# Patient Record
Sex: MALE | Race: BLACK OR AFRICAN AMERICAN | Employment: FULL TIME | URBAN - METROPOLITAN AREA
[De-identification: names, ages, dates, MRNs, and addresses within clinical notes are randomized per-mention and may not be internally consistent; named-entity substitution may affect disease eponyms.]

---

## 2018-11-12 ENCOUNTER — OFFICE VISIT (OUTPATIENT)
Dept: INTERNAL MEDICINE CLINIC | Facility: CLINIC | Age: 63
End: 2018-11-12
Payer: COMMERCIAL

## 2018-11-12 VITALS
HEART RATE: 94 BPM | SYSTOLIC BLOOD PRESSURE: 166 MMHG | WEIGHT: 272.88 LBS | DIASTOLIC BLOOD PRESSURE: 92 MMHG | TEMPERATURE: 98 F | BODY MASS INDEX: 35.02 KG/M2 | HEIGHT: 74 IN

## 2018-11-12 DIAGNOSIS — R25.2 MUSCLE CRAMPS: ICD-10-CM

## 2018-11-12 DIAGNOSIS — Z12.11 SCREENING FOR COLON CANCER: ICD-10-CM

## 2018-11-12 DIAGNOSIS — R39.9 LOWER URINARY TRACT SYMPTOMS (LUTS): ICD-10-CM

## 2018-11-12 DIAGNOSIS — Z00.00 ANNUAL PHYSICAL EXAM: Primary | ICD-10-CM

## 2018-11-12 DIAGNOSIS — Z12.5 SCREENING FOR PROSTATE CANCER: ICD-10-CM

## 2018-11-12 PROBLEM — I10 ESSENTIAL HYPERTENSION: Status: ACTIVE | Noted: 2018-11-12

## 2018-11-12 PROCEDURE — 99212 OFFICE O/P EST SF 10 MIN: CPT | Performed by: INTERNAL MEDICINE

## 2018-11-12 PROCEDURE — 99213 OFFICE O/P EST LOW 20 MIN: CPT | Performed by: INTERNAL MEDICINE

## 2018-11-12 PROCEDURE — 99386 PREV VISIT NEW AGE 40-64: CPT | Performed by: INTERNAL MEDICINE

## 2018-11-12 RX ORDER — AMLODIPINE BESYLATE 5 MG/1
5 TABLET ORAL DAILY
Qty: 30 TABLET | Refills: 3 | Status: SHIPPED | OUTPATIENT
Start: 2018-11-12 | End: 2018-11-26

## 2018-11-12 NOTE — PROGRESS NOTES
Kimmy Whelan is a 66year old male. Patient presents with:  Prostate Problem  Leg Pain      HPI:     HPI   Is a 42-year-old male here as a new patient to establish care and for physical  No recent doctors visit. No significant past medical history.   H History:  Social History    Tobacco Use      Smoking status: Never Smoker      Smokeless tobacco: Never Used    Alcohol use: No      Frequency: Never    Drug use: Not on file       REVIEW OF SYSTEMS:   Review of Systems   Constitutional: Negative for chill Abdominal: Soft. Bowel sounds are normal.   Genitourinary: No penile tenderness. Genitourinary Comments: P{t refused prostate exam   Musculoskeletal: Normal range of motion. Neurological: He is alert and oriented to person, place, and time.  He has no GASTRO - INTERNAL          Talked about age-appropriate vaccination. Tdap vaccination-for tetanus and whooping cough as recommended every 10 years. After age 48 shingles vaccination-Shingrix is recommended. It is a 2 series vaccination.   It has better p

## 2018-11-12 NOTE — ASSESSMENT & PLAN NOTE
Labs ordered. Discussed with the patient about age appropriate screening and immunization.   Advised healthy lifestyle with regular exercise and modification of the diet  Advised 30 minutes of aerobic exercise or weightbearing exercise daily for at least 5

## 2018-11-12 NOTE — ASSESSMENT & PLAN NOTE
Likely multifactorial due to lack of sleep, long-standing working hours and possible underlying THEODORE. Check electrolytes and kidney function. Replace as needed.   Suggested taking OTC B complex as it has a research evidence showing benefits in treating mus

## 2018-11-12 NOTE — ASSESSMENT & PLAN NOTE
Symptoms-nocturia and increased frequency. Unknown prostate hormone. Check PSA. Patient refused  exam.  Refer to urology.

## 2018-11-12 NOTE — ASSESSMENT & PLAN NOTE
Last colonoscopy more than 10 years ago. GI referral given. Patient has uncontrolled hypertension. GI consult prior to the colonoscopy.

## 2018-11-12 NOTE — PATIENT INSTRUCTIONS
I have started the medication-amlodipine 5 mg. The goal blood pressure reading is less than 130/80. Please watch  low salt diet. Eat less of canned , frozen, dried, packaged and fast food. Limit caffeine, alcohol. No smoking.   Exercise regularly, at

## 2018-11-12 NOTE — ASSESSMENT & PLAN NOTE
BP uncontrolled. Goal less than 130/80. Repeat blood pressure was 160/98. Has been hypertensive for 3 years. Patient refused treatment when suggested by his previous PCP 3 years ago. Willing to start BP meds now. Known kidney baseline.   Patient has m

## 2018-11-19 ENCOUNTER — LAB ENCOUNTER (OUTPATIENT)
Dept: LAB | Facility: HOSPITAL | Age: 63
End: 2018-11-19
Attending: INTERNAL MEDICINE
Payer: COMMERCIAL

## 2018-11-19 DIAGNOSIS — Z00.00 ANNUAL PHYSICAL EXAM: ICD-10-CM

## 2018-11-19 DIAGNOSIS — Z12.5 SCREENING FOR PROSTATE CANCER: ICD-10-CM

## 2018-11-19 DIAGNOSIS — R25.2 MUSCLE CRAMPS: ICD-10-CM

## 2018-11-19 DIAGNOSIS — R73.01 FASTING HYPERGLYCEMIA: ICD-10-CM

## 2018-11-19 PROCEDURE — 85025 COMPLETE CBC W/AUTO DIFF WBC: CPT

## 2018-11-19 PROCEDURE — 81001 URINALYSIS AUTO W/SCOPE: CPT

## 2018-11-19 PROCEDURE — 36415 COLL VENOUS BLD VENIPUNCTURE: CPT

## 2018-11-19 PROCEDURE — 80061 LIPID PANEL: CPT

## 2018-11-19 PROCEDURE — 84443 ASSAY THYROID STIM HORMONE: CPT

## 2018-11-19 PROCEDURE — 83036 HEMOGLOBIN GLYCOSYLATED A1C: CPT

## 2018-11-19 PROCEDURE — 83735 ASSAY OF MAGNESIUM: CPT

## 2018-11-19 PROCEDURE — 80053 COMPREHEN METABOLIC PANEL: CPT

## 2018-11-26 ENCOUNTER — OFFICE VISIT (OUTPATIENT)
Dept: INTERNAL MEDICINE CLINIC | Facility: CLINIC | Age: 63
End: 2018-11-26
Payer: COMMERCIAL

## 2018-11-26 VITALS
SYSTOLIC BLOOD PRESSURE: 122 MMHG | BODY MASS INDEX: 34.69 KG/M2 | WEIGHT: 270.31 LBS | HEIGHT: 74 IN | DIASTOLIC BLOOD PRESSURE: 78 MMHG

## 2018-11-26 DIAGNOSIS — I10 ESSENTIAL HYPERTENSION: ICD-10-CM

## 2018-11-26 DIAGNOSIS — R25.2 MUSCLE CRAMPS: ICD-10-CM

## 2018-11-26 DIAGNOSIS — E83.42 HYPOMAGNESEMIA: ICD-10-CM

## 2018-11-26 DIAGNOSIS — E78.1 HYPERTRIGLYCERIDEMIA: ICD-10-CM

## 2018-11-26 DIAGNOSIS — E11.65 TYPE 2 DIABETES MELLITUS WITH HYPERGLYCEMIA, WITHOUT LONG-TERM CURRENT USE OF INSULIN (HCC): Primary | ICD-10-CM

## 2018-11-26 PROCEDURE — 99212 OFFICE O/P EST SF 10 MIN: CPT | Performed by: INTERNAL MEDICINE

## 2018-11-26 PROCEDURE — 99215 OFFICE O/P EST HI 40 MIN: CPT | Performed by: INTERNAL MEDICINE

## 2018-11-26 RX ORDER — THIAMINE HCL 100 MG
1 TABLET ORAL
Qty: 30 TABLET | Refills: 0 | Status: SHIPPED | OUTPATIENT
Start: 2018-11-26 | End: 2018-12-26

## 2018-11-26 RX ORDER — LOSARTAN POTASSIUM 25 MG/1
25 TABLET ORAL DAILY
Qty: 30 TABLET | Refills: 3 | Status: SHIPPED | OUTPATIENT
Start: 2018-11-26 | End: 2019-03-20

## 2018-11-26 RX ORDER — METFORMIN HYDROCHLORIDE 500 MG/1
500 TABLET, EXTENDED RELEASE ORAL 2 TIMES DAILY WITH MEALS
Qty: 60 TABLET | Refills: 3 | Status: SHIPPED | OUTPATIENT
Start: 2018-11-26 | End: 2019-03-18

## 2018-11-26 NOTE — ASSESSMENT & PLAN NOTE
Blood pressure controlled. Amlodipine 5 mg was started last office visit. Changing to losartan 25 mg  due to diabetes. Goal blood pressure less than 130/80. Check blood pressure at home. Titrate losartan to 50 mg if needed. Reassess in 4 weeks.   Co

## 2018-11-26 NOTE — ASSESSMENT & PLAN NOTE
Likely due to low magnesium levels. MG 1.6. Supplement magnesium 500 mg 3 times a week. Rx sent to the pharmacy. Recheck levels in 4 weeks. Low magnesium due to increased urinary excretion and output due to hyperglycemia.

## 2018-11-26 NOTE — PATIENT INSTRUCTIONS
Discussed about how to identify hypoglycemia and the steps to take. Informed that it could present as  shaking, lightheadedness, clammy skin, chest tightness, anxiety palpitations and sweating. Please check the blood sugar.   If lower than 80 take sugar t

## 2018-11-26 NOTE — PROGRESS NOTES
Krish Moore is a 66year old male. Patient presents with:  Hypertension  Lab Results      HPI:     HPI  Patient is here for 2-week follow-up of hypertension and for abnormal lab results.   Last seen in the office on 11/12/2018 for annual physical.  He chills, fever, malaise/fatigue and weight loss. HENT: Negative for congestion, sinus pain, sore throat and tinnitus. Eyes: Negative for blurred vision and double vision.    Respiratory: Negative for cough, sputum production, shortness of breath and whe diet and exercise. Encouraged to do more cardio-, elliptical and treadmill.          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 Consider starting statin next office visit. Diabetic education referral given.            Relevant Medications    MetFORMIN HCl  MG Oral Tablet 24 Hr    Other Relevant Orders    DME DIABETIC TEST

## 2018-11-26 NOTE — ASSESSMENT & PLAN NOTE
Avoid sugar sweetened beverages  Reduce the amount of potatoes, white bread , pastas  Substitute with low fat diary products, fruits, vegetables, whole grain bread, fish, poultry. Use unsaturated vegetable oil.   Exercise regularly 30-60 min of intermitten

## 2018-11-26 NOTE — ASSESSMENT & PLAN NOTE
New onset diabetes-11/2018. Hemoglobin A1c of 10.4. Fasting blood sugar of 346. Not on any medication. Starting metformin extended release 500 mg twice daily. Steadily bring the blood sugar down due to risk of hypoglycemia.   Start glipizide in 4 weeks

## 2018-12-12 RX ORDER — BLOOD SUGAR DIAGNOSTIC
STRIP MISCELLANEOUS
Qty: 100 STRIP | Refills: 3 | Status: SHIPPED | OUTPATIENT
Start: 2018-12-12 | End: 2018-12-17 | Stop reason: ALTCHOICE

## 2018-12-12 RX ORDER — BLOOD-GLUCOSE METER
KIT MISCELLANEOUS
Qty: 1 DEVICE | Refills: 0 | Status: SHIPPED | OUTPATIENT
Start: 2018-12-12 | End: 2019-09-16

## 2018-12-12 RX ORDER — LANCETS
EACH MISCELLANEOUS
Qty: 100 EACH | Refills: 3 | Status: SHIPPED | OUTPATIENT
Start: 2018-12-12 | End: 2018-12-17 | Stop reason: ALTCHOICE

## 2018-12-17 ENCOUNTER — OFFICE VISIT (OUTPATIENT)
Dept: INTERNAL MEDICINE CLINIC | Facility: CLINIC | Age: 63
End: 2018-12-17
Payer: COMMERCIAL

## 2018-12-17 VITALS
TEMPERATURE: 99 F | BODY MASS INDEX: 35 KG/M2 | DIASTOLIC BLOOD PRESSURE: 76 MMHG | HEART RATE: 74 BPM | SYSTOLIC BLOOD PRESSURE: 132 MMHG | HEIGHT: 74 IN

## 2018-12-17 DIAGNOSIS — E11.65 TYPE 2 DIABETES MELLITUS WITH HYPERGLYCEMIA, WITHOUT LONG-TERM CURRENT USE OF INSULIN (HCC): Primary | ICD-10-CM

## 2018-12-17 DIAGNOSIS — E78.1 HYPERTRIGLYCERIDEMIA: ICD-10-CM

## 2018-12-17 PROCEDURE — 99212 OFFICE O/P EST SF 10 MIN: CPT | Performed by: INTERNAL MEDICINE

## 2018-12-17 PROCEDURE — 99214 OFFICE O/P EST MOD 30 MIN: CPT | Performed by: INTERNAL MEDICINE

## 2018-12-17 RX ORDER — BLOOD-GLUCOSE METER
EACH MISCELLANEOUS
Refills: 0 | COMMUNITY
Start: 2018-11-26 | End: 2019-09-16

## 2018-12-17 NOTE — ASSESSMENT & PLAN NOTE
On metformin 500 mg extended release twice daily. Tolerating medication well. Denies any hypoglycemia. Denies any side effects. CPM.  Continue losartan 25 mG, consider starting statin in 2 months of the LDL more than 70. Patient refused Pneumovax.   Javier Hogue

## 2018-12-17 NOTE — ASSESSMENT & PLAN NOTE
Triglycerides of 209. LDL of 90. Repeat lipid panel in 2 months. If LDL more than 70 consider moderate intensity statin due to diabetes. Patient agreeable.

## 2018-12-17 NOTE — PROGRESS NOTES
Courtney Maguire is a 66year old male. Patient presents with:  Diabetes  Hypertension      HPI:     HPI  Patient is here for follow-up of hypertension and diabetes. He was started on metformin 500 mg extended release twice daily.   Has been checking his b mouth daily. Disp: 30 tablet Rfl: 3   MetFORMIN HCl  MG Oral Tablet 24 Hr Take 1 tablet (500 mg total) by mouth 2 (two) times daily with meals.  Disp: 60 tablet Rfl: 3   Magnesium 500 MG Oral Tab Take 1 tablet (500 mg total) by mouth every other day a normal heart sounds and intact distal pulses. Pulmonary/Chest: Effort normal and breath sounds normal.   Abdominal: Soft. Bowel sounds are normal.   Neurological: He is alert and oriented to person, place, and time. He has normal reflexes.    Skin: Skin i

## 2018-12-22 RX ORDER — LANCETS 33 GAUGE
EACH MISCELLANEOUS
Qty: 200 EACH | Refills: 0 | Status: SHIPPED | OUTPATIENT
Start: 2018-12-22

## 2018-12-23 NOTE — TELEPHONE ENCOUNTER
Diabetes Medications  Protocol Criteria:  · Appointment scheduled in the past 6 months or the next 3 months  · A1C < 7.5 in the past 6 months  · Creatinine in the past 12 months  · Creatinine result < 1.5   Recent Outpatient Visits            5 days ago Ty

## 2019-01-14 ENCOUNTER — OFFICE VISIT (OUTPATIENT)
Dept: SURGERY | Facility: CLINIC | Age: 64
End: 2019-01-14
Payer: COMMERCIAL

## 2019-01-14 VITALS
WEIGHT: 270 LBS | HEART RATE: 79 BPM | HEIGHT: 74 IN | DIASTOLIC BLOOD PRESSURE: 91 MMHG | SYSTOLIC BLOOD PRESSURE: 151 MMHG | BODY MASS INDEX: 34.65 KG/M2

## 2019-01-14 DIAGNOSIS — N52.9 ERECTILE DYSFUNCTION, UNSPECIFIED ERECTILE DYSFUNCTION TYPE: ICD-10-CM

## 2019-01-14 DIAGNOSIS — N48.6 PEYRONIE DISEASE: Primary | ICD-10-CM

## 2019-01-14 DIAGNOSIS — N40.1 BENIGN PROSTATIC HYPERPLASIA WITH LOWER URINARY TRACT SYMPTOMS, SYMPTOM DETAILS UNSPECIFIED: ICD-10-CM

## 2019-01-14 PROCEDURE — 99244 OFF/OP CNSLTJ NEW/EST MOD 40: CPT | Performed by: UROLOGY

## 2019-01-14 PROCEDURE — 99212 OFFICE O/P EST SF 10 MIN: CPT | Performed by: UROLOGY

## 2019-01-14 RX ORDER — SILDENAFIL 100 MG/1
100 TABLET, FILM COATED ORAL
Qty: 6 TABLET | Refills: 11 | Status: SHIPPED | OUTPATIENT
Start: 2019-01-14 | End: 2019-09-16

## 2019-01-14 NOTE — PROGRESS NOTES
SUBJECTIVE:  Sherwin Chang is a 66year old male who presents for a consultation at the request of, and a copy of this note will be sent to, DR. Noa Doll, for evaluation of  Erectile dysfunction, penile curvature.   Notes a 45 degree leftward of penis Right arm, Patient Position: Sitting, Cuff Size: large)   Pulse 79   Ht 6' 2\" (1.88 m)   Wt 270 lb (122.5 kg)   BMI 34.67 kg/m²   He appears well, in no apparent distress. Alert and oriented times three, pleasant and cooperative.   CARDIOVASCULAR:normal a consider starting him on alpha blockers.     Meds This Visit:  Requested Prescriptions      No prescriptions requested or ordered in this encounter       Imaging & Referrals:  None

## 2019-01-22 ENCOUNTER — TELEPHONE (OUTPATIENT)
Dept: INTERNAL MEDICINE CLINIC | Facility: CLINIC | Age: 64
End: 2019-01-22

## 2019-01-22 NOTE — TELEPHONE ENCOUNTER
Fax from [facility] states-  We spoke to your patient about diabetes care and noticed your patient has not filled a statin therapy at [facility] pharmacy in the last 180 days.  Your patient would like us to reach out on their behalf to determine if it is appr

## 2019-01-23 RX ORDER — ATORVASTATIN CALCIUM 20 MG/1
20 TABLET, FILM COATED ORAL NIGHTLY
Qty: 90 TABLET | Refills: 3 | Status: SHIPPED | OUTPATIENT
Start: 2019-01-23 | End: 2019-04-23

## 2019-01-23 NOTE — TELEPHONE ENCOUNTER
Lipitor 20 mg sent to the pharmacy. Patient to do labs prior to the next office visit. Please inform. Next appointment scheduled on March 18.

## 2019-01-24 NOTE — TELEPHONE ENCOUNTER
Patient contacted and informed of Dr Francois Werner note regarding rx sent to pharmacy and fasting labs ordered and to be completed prior to next office visit.

## 2019-02-04 ENCOUNTER — TELEPHONE (OUTPATIENT)
Dept: GASTROENTEROLOGY | Facility: CLINIC | Age: 64
End: 2019-02-04

## 2019-02-04 ENCOUNTER — OFFICE VISIT (OUTPATIENT)
Dept: GASTROENTEROLOGY | Facility: CLINIC | Age: 64
End: 2019-02-04
Payer: COMMERCIAL

## 2019-02-04 VITALS
SYSTOLIC BLOOD PRESSURE: 128 MMHG | DIASTOLIC BLOOD PRESSURE: 78 MMHG | HEIGHT: 74 IN | BODY MASS INDEX: 32.6 KG/M2 | WEIGHT: 254 LBS | HEART RATE: 74 BPM

## 2019-02-04 DIAGNOSIS — E78.1 HYPERTRIGLYCERIDEMIA: Primary | ICD-10-CM

## 2019-02-04 DIAGNOSIS — E83.42 HYPOMAGNESEMIA: ICD-10-CM

## 2019-02-04 DIAGNOSIS — Z12.11 ENCOUNTER FOR SCREENING COLONOSCOPY: ICD-10-CM

## 2019-02-04 PROCEDURE — S0285 CNSLT BEFORE SCREEN COLONOSC: HCPCS | Performed by: INTERNAL MEDICINE

## 2019-02-04 PROCEDURE — 99212 OFFICE O/P EST SF 10 MIN: CPT | Performed by: INTERNAL MEDICINE

## 2019-02-04 RX ORDER — AMLODIPINE BESYLATE 5 MG/1
5 TABLET ORAL
Refills: 3 | COMMUNITY
Start: 2018-12-20

## 2019-02-04 RX ORDER — POLYETHYLENE GLYCOL 3350, SODIUM CHLORIDE, POTASSIUM CHLORIDE, SODIUM BICARBONATE, AND SODIUM SULFATE 240; 5.84; 2.98; 6.72; 22.72 G/4L; G/4L; G/4L; G/4L; G/4L
POWDER, FOR SOLUTION ORAL
Qty: 1 BOTTLE | Refills: 0 | Status: SHIPPED | OUTPATIENT
Start: 2019-02-04 | End: 2019-03-18 | Stop reason: ALTCHOICE

## 2019-02-04 NOTE — PATIENT INSTRUCTIONS
Schedule colonoscopy exam at [facility]    ** Monday procedure please **    This patient IS appropriate for the [facility] endoscopy center. Body mass index is 32.61 kg/m².     MAC anesthesia    Golytely (PEG)

## 2019-02-04 NOTE — TELEPHONE ENCOUNTER
GI RN's    Pt is scheduled for colonoscopy, see details below. He has [employer] if you could be please check for PA. Thank you. Alert/Awake

## 2019-02-04 NOTE — PROGRESS NOTES
HPI:    Patient ID: Xavier Carr is a 60year old man with BMI 32.6, history type 2 diabetes who is referred by Dr. Noah Tucker for his first screening colonoscopy examination. Recent concern for hypertriglyceridemia, hypomagnesemia on labs.     On re Known Allergies  Imaging: No results found. PHYSICAL EXAM:   Physical Exam   Constitutional: He is oriented to person, place, and time. He appears well-developed. HENT:   Head: Normocephalic. Eyes: Pupils are equal, round, and reactive to light. Imaging & Referrals:  None       UP#[id number]

## 2019-02-04 NOTE — TELEPHONE ENCOUNTER
Pt of Dr. Yasmin Driscoll but pt needed a Monday for procedure. Ok to schedule with Dr. Ruth Suarez.      Scheduled for:  Colonoscopy [id number]  Provider Name: Dr. Ruth Suarez  Date:  3/11/19  Location:  [facility]  Sedation:  MAC  Time:  1:45 pm, (pt is aware that Pending sale to [facility] will call the day before

## 2019-02-05 NOTE — TELEPHONE ENCOUNTER
PATIENT OF DR. Kesha Carter    Spoke to Alexis Valiente at [facility] ([phone]) and provided DOS, CPT/DX codes, MD NPI, EOSJAISON NPI. States the case is authorized with authorization [id number].

## 2019-02-25 ENCOUNTER — OFFICE VISIT (OUTPATIENT)
Dept: SURGERY | Facility: CLINIC | Age: 64
End: 2019-02-25
Payer: COMMERCIAL

## 2019-02-25 VITALS
BODY MASS INDEX: 32.6 KG/M2 | SYSTOLIC BLOOD PRESSURE: 154 MMHG | HEART RATE: 74 BPM | DIASTOLIC BLOOD PRESSURE: 82 MMHG | WEIGHT: 254 LBS | HEIGHT: 74 IN

## 2019-02-25 DIAGNOSIS — N52.9 ERECTILE DYSFUNCTION, UNSPECIFIED ERECTILE DYSFUNCTION TYPE: Primary | ICD-10-CM

## 2019-02-25 PROCEDURE — 99212 OFFICE O/P EST SF 10 MIN: CPT | Performed by: UROLOGY

## 2019-02-25 PROCEDURE — 99213 OFFICE O/P EST LOW 20 MIN: CPT | Performed by: UROLOGY

## 2019-02-25 NOTE — PROGRESS NOTES
Monica Carlos is a 60year old male.     HPI:   Patient presents with:  Erectile Dysfuntion: patient presents for f/u on ed took sildenafil 100mg and did see any improvement in symptoms      22-year-old -American male in follow-up to visit January Rfl: 0   MetFORMIN HCl  MG Oral Tablet 24 Hr Take 1 tablet (500 mg total) by mouth 2 (two) times daily with meals.  Disp: 60 tablet Rfl: 3       Allergies:  No Known Allergies      ROS:       PHYSICAL EXAM:        ASSESSMENT/PLAN:   Assessment   Erect

## 2019-03-11 ENCOUNTER — LAB REQUISITION (OUTPATIENT)
Dept: LAB | Facility: HOSPITAL | Age: 64
End: 2019-03-11
Payer: COMMERCIAL

## 2019-03-11 ENCOUNTER — APPOINTMENT (OUTPATIENT)
Dept: LAB | Facility: HOSPITAL | Age: 64
End: 2019-03-11
Attending: INTERNAL MEDICINE
Payer: COMMERCIAL

## 2019-03-11 DIAGNOSIS — E83.42 HYPOMAGNESEMIA: ICD-10-CM

## 2019-03-11 DIAGNOSIS — Z01.89 ENCOUNTER FOR OTHER SPECIFIED SPECIAL EXAMINATIONS: ICD-10-CM

## 2019-03-11 DIAGNOSIS — E11.65 TYPE 2 DIABETES MELLITUS WITH HYPERGLYCEMIA, WITHOUT LONG-TERM CURRENT USE OF INSULIN (HCC): ICD-10-CM

## 2019-03-11 DIAGNOSIS — E78.1 HYPERTRIGLYCERIDEMIA: ICD-10-CM

## 2019-03-11 DIAGNOSIS — N52.9 ERECTILE DYSFUNCTION, UNSPECIFIED ERECTILE DYSFUNCTION TYPE: ICD-10-CM

## 2019-03-11 LAB
ANION GAP SERPL CALC-SCNC: 5 MMOL/L (ref 0–18)
BUN BLD-MCNC: 11 MG/DL (ref 7–18)
BUN/CREAT SERPL: 12.8 (ref 10–20)
CALCIUM BLD-MCNC: 8.9 MG/DL (ref 8.5–10.1)
CHLORIDE SERPL-SCNC: 106 MMOL/L (ref 98–107)
CHOLEST SMN-MCNC: 105 MG/DL (ref ?–200)
CO2 SERPL-SCNC: 28 MMOL/L (ref 21–32)
CREAT BLD-MCNC: 0.86 MG/DL (ref 0.7–1.3)
CREAT UR-SCNC: 122 MG/DL
EST. AVERAGE GLUCOSE BLD GHB EST-MCNC: 108 MG/DL (ref 68–126)
GLUCOSE BLD-MCNC: 90 MG/DL (ref 70–99)
HAV IGM SER QL: 2 MG/DL (ref 1.6–2.6)
HBA1C MFR BLD HPLC: 5.4 % (ref ?–5.7)
HDLC SERPL-MCNC: 38 MG/DL (ref 40–59)
LDLC SERPL CALC-MCNC: 49 MG/DL (ref ?–100)
MICROALBUMIN UR-MCNC: 1.28 MG/DL
MICROALBUMIN/CREAT 24H UR-RTO: 10.5 UG/MG (ref ?–30)
NONHDLC SERPL-MCNC: 67 MG/DL (ref ?–130)
OSMOLALITY SERPL CALC.SUM OF ELEC: 287 MOSM/KG (ref 275–295)
POTASSIUM SERPL-SCNC: 3.7 MMOL/L (ref 3.5–5.1)
SODIUM SERPL-SCNC: 139 MMOL/L (ref 136–145)
TRIGL SERPL-MCNC: 88 MG/DL (ref 30–149)
VLDLC SERPL CALC-MCNC: 18 MG/DL (ref 0–30)

## 2019-03-11 PROCEDURE — 84403 ASSAY OF TOTAL TESTOSTERONE: CPT

## 2019-03-11 PROCEDURE — 83036 HEMOGLOBIN GLYCOSYLATED A1C: CPT

## 2019-03-11 PROCEDURE — 80048 BASIC METABOLIC PNL TOTAL CA: CPT

## 2019-03-11 PROCEDURE — 82570 ASSAY OF URINE CREATININE: CPT

## 2019-03-11 PROCEDURE — 88305 TISSUE EXAM BY PATHOLOGIST: CPT | Performed by: INTERNAL MEDICINE

## 2019-03-11 PROCEDURE — 36415 COLL VENOUS BLD VENIPUNCTURE: CPT

## 2019-03-11 PROCEDURE — 80061 LIPID PANEL: CPT

## 2019-03-11 PROCEDURE — 84402 ASSAY OF FREE TESTOSTERONE: CPT

## 2019-03-11 PROCEDURE — 82043 UR ALBUMIN QUANTITATIVE: CPT

## 2019-03-11 PROCEDURE — 83735 ASSAY OF MAGNESIUM: CPT

## 2019-03-14 ENCOUNTER — TELEPHONE (OUTPATIENT)
Dept: GASTROENTEROLOGY | Facility: CLINIC | Age: 64
End: 2019-03-14

## 2019-03-14 NOTE — TELEPHONE ENCOUNTER
I mailed out Colonoscopy results letter to pt  Updated health maintenance  Entered into 3 yr CLN recall  Recall colon in 3 years per.  Colon done 3/11/19    GI staff: please place recall for colonoscopy in 3 years

## 2019-03-14 NOTE — TELEPHONE ENCOUNTER
Current Outpatient Medications:  Blood Glucose Monitoring Suppl (ONETOUCH ULTRA 2) w/Device Does not apply Kit TEST BLOOD 2 TIMES PER DAY Disp:  Rfl: 0       Alternative requested. Third party does not cover.

## 2019-03-15 LAB
TESTOSTERONE, FREE, S: 9.18 NG/DL
TESTOSTERONE, TOTAL, S: 353 NG/DL

## 2019-03-15 RX ORDER — BLOOD SUGAR DIAGNOSTIC
STRIP MISCELLANEOUS
Qty: 200 STRIP | Refills: 3 | Status: SHIPPED | OUTPATIENT
Start: 2019-03-15 | End: 2019-03-18

## 2019-03-15 RX ORDER — LANCETS
EACH MISCELLANEOUS
Qty: 200 EACH | Refills: 3 | Status: SHIPPED | OUTPATIENT
Start: 2019-03-15

## 2019-03-15 RX ORDER — BLOOD-GLUCOSE METER
EACH MISCELLANEOUS
Qty: 1 KIT | Refills: 0 | Status: SHIPPED | OUTPATIENT
Start: 2019-03-15

## 2019-03-18 ENCOUNTER — OFFICE VISIT (OUTPATIENT)
Dept: INTERNAL MEDICINE CLINIC | Facility: CLINIC | Age: 64
End: 2019-03-18
Payer: COMMERCIAL

## 2019-03-18 VITALS
BODY MASS INDEX: 32.68 KG/M2 | SYSTOLIC BLOOD PRESSURE: 138 MMHG | DIASTOLIC BLOOD PRESSURE: 83 MMHG | HEART RATE: 65 BPM | WEIGHT: 254.63 LBS | HEIGHT: 74 IN | TEMPERATURE: 98 F

## 2019-03-18 DIAGNOSIS — Z23 NEED FOR VACCINATION: ICD-10-CM

## 2019-03-18 DIAGNOSIS — I10 ESSENTIAL HYPERTENSION: Primary | ICD-10-CM

## 2019-03-18 DIAGNOSIS — E11.65 TYPE 2 DIABETES MELLITUS WITH HYPERGLYCEMIA, WITHOUT LONG-TERM CURRENT USE OF INSULIN (HCC): ICD-10-CM

## 2019-03-18 PROCEDURE — 99212 OFFICE O/P EST SF 10 MIN: CPT | Performed by: INTERNAL MEDICINE

## 2019-03-18 PROCEDURE — 99214 OFFICE O/P EST MOD 30 MIN: CPT | Performed by: INTERNAL MEDICINE

## 2019-03-18 PROCEDURE — 90732 PPSV23 VACC 2 YRS+ SUBQ/IM: CPT | Performed by: INTERNAL MEDICINE

## 2019-03-18 PROCEDURE — 90471 IMMUNIZATION ADMIN: CPT | Performed by: INTERNAL MEDICINE

## 2019-03-18 RX ORDER — LOSARTAN POTASSIUM 25 MG/1
TABLET ORAL DAILY
COMMUNITY
End: 2019-09-16

## 2019-03-18 RX ORDER — METFORMIN HYDROCHLORIDE 500 MG/1
500 TABLET, EXTENDED RELEASE ORAL
COMMUNITY
End: 2019-03-23

## 2019-03-18 NOTE — PROGRESS NOTES
Tosin Barney is a 60year old male. Patient presents with:  Diabetes: lab results  Hypertension      HPI:     HPI  Patient is here for follow-up of the diabetes and hypertension. He is doing so well. His A1c came down from 10.5-5.4.   He is currently daily Disp: 200 strip Rfl: 3   Blood Glucose Monitoring Suppl (ASSURE PRO BLOOD GLUCOSE METER) Does not apply Device Check blood sugar 2 times a day, fasting and 2 hours after a meal- preferably dinner Disp: 1 Device Rfl: 0   AmLODIPine Besylate 5 MG Oral Constitutional: He is oriented to person, place, and time. He appears well-developed and well-nourished. Cardiovascular: Normal rate, regular rhythm, normal heart sounds and intact distal pulses.    Pulmonary/Chest: Effort normal and breath sounds yimi adenoma. Had recent colonoscopy showing 3 polyps with tubular adenoma. Repeat colonoscopy recommended in 3 years. Patient is aware of this. Erectile dysfunction. Follow-up with urology as scheduled.         The patient indicates understanding of thes

## 2019-03-18 NOTE — ASSESSMENT & PLAN NOTE
HGBA1C:    Lab Results   Component Value Date    A1C 5.4 03/11/2019    A1C 10.4 (H) 11/19/2018     03/11/2019   On metformin 500 mg extended release twice daily. Tolerating medication well. Denies any hypoglycemia.   Doing carb count and regular wi

## 2019-03-20 RX ORDER — LOSARTAN POTASSIUM 25 MG/1
TABLET ORAL
Qty: 30 TABLET | Refills: 2 | Status: SHIPPED | OUTPATIENT
Start: 2019-03-20 | End: 2019-06-12

## 2019-03-20 RX ORDER — METFORMIN HYDROCHLORIDE 500 MG/1
TABLET, EXTENDED RELEASE ORAL
Qty: 60 TABLET | Refills: 3 | OUTPATIENT
Start: 2019-03-20

## 2019-03-23 RX ORDER — METFORMIN HYDROCHLORIDE 500 MG/1
500 TABLET, EXTENDED RELEASE ORAL
Qty: 90 TABLET | Refills: 3 | Status: SHIPPED | OUTPATIENT
Start: 2019-03-23 | End: 2022-03-28

## 2019-04-13 ENCOUNTER — OFFICE VISIT (OUTPATIENT)
Dept: FAMILY MEDICINE CLINIC | Facility: CLINIC | Age: 64
End: 2019-04-13

## 2019-04-13 VITALS
HEART RATE: 70 BPM | BODY MASS INDEX: 33 KG/M2 | DIASTOLIC BLOOD PRESSURE: 72 MMHG | RESPIRATION RATE: 16 BRPM | SYSTOLIC BLOOD PRESSURE: 160 MMHG | OXYGEN SATURATION: 97 % | WEIGHT: 256 LBS | TEMPERATURE: 98 F

## 2019-04-13 DIAGNOSIS — R03.0 ELEVATED BLOOD PRESSURE READING: ICD-10-CM

## 2019-04-13 DIAGNOSIS — J01.00 ACUTE NON-RECURRENT MAXILLARY SINUSITIS: Primary | ICD-10-CM

## 2019-04-13 PROCEDURE — 99202 OFFICE O/P NEW SF 15 MIN: CPT | Performed by: PHYSICIAN ASSISTANT

## 2019-04-13 RX ORDER — AMOXICILLIN AND CLAVULANATE POTASSIUM 875; 125 MG/1; MG/1
1 TABLET, FILM COATED ORAL 2 TIMES DAILY
Qty: 10 TABLET | Refills: 0 | Status: SHIPPED | OUTPATIENT
Start: 2019-04-13 | End: 2019-04-18

## 2019-04-13 NOTE — PROGRESS NOTES
CHIEF COMPLAINT:   Patient presents with:  Sinus Problem: 2 weeks, nasal congestion, mucus, mild sore throat      HPI:   Vincent Grullon is a 60year old male who presents for cold symptoms for  2 weeks.  Symptoms have progressed into sinus congestion and b 100 MG Oral Tab Take 1 tablet (100 mg total) by mouth daily as needed for Erectile Dysfunction.  Disp: 6 tablet Rfl: 11      Past Medical History:   Diagnosis Date   • Diabetes mellitus ([facility] [address]    • Essential hypertension    • Tubular adenoma of colon 03/2019 the past 24 hour(s)). ASSESSMENT:   Vincent Grullon is a 60year old male who presents with Sinus Problem (2 weeks, nasal congestion, mucus, mild sore throat).  Symptoms are consistent with:    Acute non-recurrent maxillary sinusitis  (primary encounter

## 2019-05-13 ENCOUNTER — OFFICE VISIT (OUTPATIENT)
Dept: SURGERY | Facility: CLINIC | Age: 64
End: 2019-05-13
Payer: COMMERCIAL

## 2019-05-13 VITALS
HEART RATE: 75 BPM | SYSTOLIC BLOOD PRESSURE: 176 MMHG | DIASTOLIC BLOOD PRESSURE: 87 MMHG | WEIGHT: 256 LBS | BODY MASS INDEX: 33 KG/M2

## 2019-05-13 DIAGNOSIS — N52.9 ERECTILE DYSFUNCTION, UNSPECIFIED ERECTILE DYSFUNCTION TYPE: Primary | ICD-10-CM

## 2019-05-13 DIAGNOSIS — N48.6 PEYRONIE DISEASE: ICD-10-CM

## 2019-05-13 PROCEDURE — 99212 OFFICE O/P EST SF 10 MIN: CPT | Performed by: UROLOGY

## 2019-05-13 PROCEDURE — 99213 OFFICE O/P EST LOW 20 MIN: CPT | Performed by: UROLOGY

## 2019-05-13 NOTE — PROGRESS NOTES
Lizy Wagoner is a 60year old male. HPI:   Patient presents with:   Follow - Up: patient presents for f/u on ed here to discuss testosterone results      29-year-old -American male in follow-up to visit February 25, 2019 with erectile dysfuncti Rfl: 0   Blood Glucose Monitoring Suppl (ONETOUCH ULTRA 2) w/Device Does not apply Kit TEST BLOOD 2 TIMES PER DAY Disp:  Rfl: 0   Glucose Blood (ONETOUCH ULTRA BLUE) In Vitro Strip Check twice daily Disp: 200 strip Rfl: 3   Blood Glucose Monitoring Suppl (

## 2019-06-12 RX ORDER — LOSARTAN POTASSIUM 25 MG/1
TABLET ORAL
Qty: 90 TABLET | Refills: 1 | Status: SHIPPED | OUTPATIENT
Start: 2019-06-12 | End: 2019-12-06

## 2019-09-16 ENCOUNTER — OFFICE VISIT (OUTPATIENT)
Dept: INTERNAL MEDICINE CLINIC | Facility: CLINIC | Age: 64
End: 2019-09-16
Payer: COMMERCIAL

## 2019-09-16 VITALS
RESPIRATION RATE: 16 BRPM | SYSTOLIC BLOOD PRESSURE: 144 MMHG | HEART RATE: 76 BPM | DIASTOLIC BLOOD PRESSURE: 76 MMHG | WEIGHT: 272 LBS | BODY MASS INDEX: 34.91 KG/M2 | HEIGHT: 74 IN

## 2019-09-16 DIAGNOSIS — R06.83 SNORING: ICD-10-CM

## 2019-09-16 DIAGNOSIS — E11.65 TYPE 2 DIABETES MELLITUS WITH HYPERGLYCEMIA, WITHOUT LONG-TERM CURRENT USE OF INSULIN (HCC): Primary | ICD-10-CM

## 2019-09-16 DIAGNOSIS — I10 ESSENTIAL HYPERTENSION WITH GOAL BLOOD PRESSURE LESS THAN 130/85: ICD-10-CM

## 2019-09-16 PROCEDURE — 99214 OFFICE O/P EST MOD 30 MIN: CPT | Performed by: INTERNAL MEDICINE

## 2019-09-16 RX ORDER — ATORVASTATIN CALCIUM 10 MG/1
10 TABLET, FILM COATED ORAL NIGHTLY
Qty: 90 TABLET | Refills: 3 | Status: SHIPPED | OUTPATIENT
Start: 2019-09-16

## 2019-09-16 NOTE — PROGRESS NOTES
Kimmy Whelan is a 60year old male. HPI:   1. Type 2 diabetes mellitus with hyperglycemia, without long-term current use of insulin (HCC)    The patient has been taking all prescribed diabetic medications at home and has been following a diabetic diet. Check twice daily Disp: 200 strip Rfl: 3      Past Medical History:   Diagnosis Date   • Diabetes mellitus ([facility] [address]    • Essential hypertension    • Tubular adenoma of colon 03/2019    repeat CLN in 3yrs      Social History:  Social History    Tobacco Use pressure control. Record blood pressures at home and bring readings to your next office visit to review. 3. Snoring    Has significant snoring, daytime sedation, dry mouth, mental fogginess.  Needs sleep study.    -  [facility] REFERRAL DIAGOSTIC SLEEP ST

## 2019-12-06 RX ORDER — LOSARTAN POTASSIUM 25 MG/1
25 TABLET ORAL
Qty: 90 TABLET | Refills: 1 | Status: SHIPPED | OUTPATIENT
Start: 2019-12-06

## 2019-12-06 NOTE — TELEPHONE ENCOUNTER
Current Outpatient Medications   Medication Sig Dispense Refill   • LOSARTAN POTASSIUM 25 MG Oral Tab TAKE 1 TABLET BY MOUTH EVERY DAY 90 tablet 1

## 2019-12-07 NOTE — TELEPHONE ENCOUNTER
Refill passed per [facility] protocol.   Hypertensive Medications  Protocol Criteria:  · Appointment scheduled in the past 6 months or in the next 3 months  · BMP or CMP in the past 12 months  · Creatinine result < 2  Recent Outpatient Visits

## 2022-02-11 ENCOUNTER — TELEPHONE (OUTPATIENT)
Dept: GASTROENTEROLOGY | Facility: CLINIC | Age: 67
End: 2022-02-11

## 2022-02-11 NOTE — TELEPHONE ENCOUNTER
----- Message from Bryant Arroyo CMA sent at 3/14/2019  5:12 PM CDT -----  Regarding: 3 yr CLN recall w/DR iCndy Taveras  Recall colon in 3 years per.  Colon done w/Dr Cinyd Taveras done 3/11/19

## 2022-02-21 ENCOUNTER — TELEPHONE (OUTPATIENT)
Dept: GASTROENTEROLOGY | Facility: CLINIC | Age: 67
End: 2022-02-21

## 2022-02-21 NOTE — TELEPHONE ENCOUNTER
Pt calling to schedule CLN. Pt received recall letter. Pt denies any GI symptoms at this time. Pt prefers to have procedure done on a Monday.  Please call [phone]

## 2022-02-28 NOTE — TELEPHONE ENCOUNTER
Elle    Patient called to schedule 3 year recall colonoscopy. Please provide orders if appropriate. Thank you    Last Procedure, Date, MD:  Dr. Jin Nunez colonoscopy 3/11/2019  Last Diagnosis:  Internal hemorrhoids, 3 tubular adenomas  Recalled for (mth/yrs): 3 years  Sedation used previously:  MAC  Last Prep Used (if known): Golytely   Quality of prep (if known): good  Anticoagulants: no  Diabetic Meds: no-reports on no medications  BP meds(Ace inhibitors/ARB's):no  Weight loss meds (phentermine/vyvanse):no  Iron supplement (RX/OTC): no  Height & Weight/BMI: 6'2\" 250lbs BMI 32.1  Hx of Cardiac/CVA issues/(MI/Stroke): no  Devices Pacemaker/Defibrillator/Stents: no  Resp. Issues/Oxygen Use/THEODORE/COPD: no  Issues w/Anesthesia: no    Symptoms (Y/N): no  Symptoms Details: no    Special comments/notes: Prefers to have procedure on a Monday    Please advise on orders and prep, thank you.

## 2022-03-01 RX ORDER — POLYETHYLENE GLYCOL 3350, SODIUM CHLORIDE, SODIUM BICARBONATE, POTASSIUM CHLORIDE 420; 11.2; 5.72; 1.48 G/4L; G/4L; G/4L; G/4L
POWDER, FOR SOLUTION ORAL
Qty: 4000 ML | Refills: 0 | Status: SHIPPED | OUTPATIENT
Start: 2022-03-01 | End: 2022-03-21 | Stop reason: ALTCHOICE

## 2022-03-01 NOTE — TELEPHONE ENCOUNTER
Scheduling:  amanda w/ christoph w/ Dr. Debi Gowers  Dx: h/o colon polyps  trilyte split dose sent e-scribe  Hold metformin day before and day of procedure  Hold losartan am of procedure

## 2022-03-01 NOTE — TELEPHONE ENCOUNTER
He needs to see a PCP ASAP otherwise we cannot proceed with c-scope. His BP and diabetes needs to be managed. He can keep his appt for colonoscopy for 5/2/22, however he needs to see a PCP asap. If he shows up with a high BP, we may have to cancel the colonoscopy. I think his prior PCP left the group, so he should make an appt to see someone new asap. Please let patient know that with uncontrolled BP, there is a risk of stroke, heart attack, etc. There are many excellent pcps to choose from at [address] [place] [address], please provide number for him to call PCP.

## 2022-03-01 NOTE — TELEPHONE ENCOUNTER
Scheduled for:  Colonoscopy [id number]  Provider Name:  Dr Riya Rust  Date: 05/02/2022    [facility]      Sedation:  MAC  Time: 1000 patient is aware that [facility] will call with time    Prep: trilyte prep instructions to be mailed to patient     Meds/Allergies Reconciled?:  Physician reviewed   Diagnosis with codes:  Hx of colon polyps Z86.010  Was patient informed to call insurance with codes (Y/N):  Yes, I confirmed BCBS PPO insurance with the patient. Referral sent?:  yes  [facility] or [facility] notified?:  I sent an electronic request to Endo Scheduling and received a confirmation today. Medication Orders: This patient verbally confirmed that he is not taking:   Iron, blood thinners, BP meds, and is not diabetic   Not latex allergy, Not PCN allergy and does not have a pacemaker  Patient sates he has not taken any medications since 2019 the start of pandemic hasnt seen his PCP since then      Misc Orders:  Patient is aware she will be scheduled for a covid 19 test prior to procedure        Further instructions given by staff:     This patient had no questions regarding the prep instructions

## 2022-03-01 NOTE — TELEPHONE ENCOUNTER
Contacted patient and informed him of message below. Patient will keep colonoscopy as scheduled but will make appt to see pcp. Will postpone encounter for later date to confirm patient seen by a pcp.

## 2022-03-01 NOTE — TELEPHONE ENCOUNTER
Dr Aylin Silva clinical    Patient is scheduled for colonoscopy procedure 05/02/2022 @ [facility]    Please advise Patient has not been taking any of his medications since 2019    Patient states he has not been able to get in to see his primary since   Patient was on Metformin and Losartan medications     Thank Shanon Glover

## 2022-03-18 PROBLEM — Z86.010 HX OF ADENOMATOUS COLONIC POLYPS: Status: ACTIVE | Noted: 2019-03-01

## 2022-03-18 PROBLEM — Z86.0101 HX OF ADENOMATOUS COLONIC POLYPS: Status: ACTIVE | Noted: 2019-03-01

## 2022-03-21 ENCOUNTER — OFFICE VISIT (OUTPATIENT)
Dept: INTERNAL MEDICINE CLINIC | Facility: CLINIC | Age: 67
End: 2022-03-21
Payer: COMMERCIAL

## 2022-03-21 VITALS
HEIGHT: 74 IN | DIASTOLIC BLOOD PRESSURE: 98 MMHG | SYSTOLIC BLOOD PRESSURE: 160 MMHG | WEIGHT: 257.63 LBS | HEART RATE: 88 BPM | BODY MASS INDEX: 33.06 KG/M2

## 2022-03-21 DIAGNOSIS — E11.42 PERIPHERAL SENSORY NEUROPATHY DUE TO TYPE 2 DIABETES MELLITUS (HCC): ICD-10-CM

## 2022-03-21 DIAGNOSIS — Z76.89 ENCOUNTER TO ESTABLISH CARE: Primary | ICD-10-CM

## 2022-03-21 DIAGNOSIS — E78.5 DYSLIPIDEMIA ASSOCIATED WITH TYPE 2 DIABETES MELLITUS (HCC): ICD-10-CM

## 2022-03-21 DIAGNOSIS — E11.69 DYSLIPIDEMIA ASSOCIATED WITH TYPE 2 DIABETES MELLITUS (HCC): ICD-10-CM

## 2022-03-21 DIAGNOSIS — Z86.010 HX OF ADENOMATOUS COLONIC POLYPS: ICD-10-CM

## 2022-03-21 DIAGNOSIS — E11.9 TYPE 2 DIABETES MELLITUS WITHOUT COMPLICATION, WITHOUT LONG-TERM CURRENT USE OF INSULIN (HCC): ICD-10-CM

## 2022-03-21 DIAGNOSIS — I10 ESSENTIAL HYPERTENSION: ICD-10-CM

## 2022-03-21 PROCEDURE — 99214 OFFICE O/P EST MOD 30 MIN: CPT | Performed by: INTERNAL MEDICINE

## 2022-03-21 PROCEDURE — 3077F SYST BP >= 140 MM HG: CPT | Performed by: INTERNAL MEDICINE

## 2022-03-21 PROCEDURE — 3080F DIAST BP >= 90 MM HG: CPT | Performed by: INTERNAL MEDICINE

## 2022-03-21 PROCEDURE — 3008F BODY MASS INDEX DOCD: CPT | Performed by: INTERNAL MEDICINE

## 2022-03-21 RX ORDER — VIT A/VIT C/VIT E/ZINC/COPPER 2148-113
2 TABLET ORAL DAILY
COMMUNITY

## 2022-03-21 RX ORDER — ASPIRIN 325 MG
325 TABLET ORAL DAILY
COMMUNITY

## 2022-03-21 NOTE — PATIENT INSTRUCTIONS
Your blood pressure today was high at 160/98. Please come in soon for blood and urine testing. Schedule a follow-up visit with me to review results a few days after labs are done.

## 2022-03-22 ENCOUNTER — LAB ENCOUNTER (OUTPATIENT)
Dept: LAB | Facility: HOSPITAL | Age: 67
End: 2022-03-22
Attending: INTERNAL MEDICINE
Payer: COMMERCIAL

## 2022-03-22 DIAGNOSIS — E11.9 TYPE 2 DIABETES MELLITUS WITHOUT COMPLICATION, WITHOUT LONG-TERM CURRENT USE OF INSULIN (HCC): ICD-10-CM

## 2022-03-22 LAB
ALBUMIN SERPL-MCNC: 3.4 G/DL (ref 3.4–5)
ALBUMIN/GLOB SERPL: 0.9 {RATIO} (ref 1–2)
ALP LIVER SERPL-CCNC: 130 U/L
ALT SERPL-CCNC: 23 U/L
ANION GAP SERPL CALC-SCNC: 9 MMOL/L (ref 0–18)
AST SERPL-CCNC: 12 U/L (ref 15–37)
BILIRUB SERPL-MCNC: 0.7 MG/DL (ref 0.1–2)
BUN BLD-MCNC: 12 MG/DL (ref 7–18)
BUN/CREAT SERPL: 11.7 (ref 10–20)
CALCIUM BLD-MCNC: 8.8 MG/DL (ref 8.5–10.1)
CHLORIDE SERPL-SCNC: 103 MMOL/L (ref 98–112)
CHOLEST SERPL-MCNC: 169 MG/DL (ref ?–200)
CO2 SERPL-SCNC: 25 MMOL/L (ref 21–32)
COMPLEXED PSA SERPL-MCNC: 1.38 NG/ML (ref ?–4)
CREAT BLD-MCNC: 1.03 MG/DL
CREAT UR-SCNC: 288 MG/DL
DEPRECATED RDW RBC AUTO: 37.9 FL (ref 35.1–46.3)
ERYTHROCYTE [DISTWIDTH] IN BLOOD BY AUTOMATED COUNT: 11.9 % (ref 11–15)
EST. AVERAGE GLUCOSE BLD GHB EST-MCNC: 232 MG/DL (ref 68–126)
FASTING PATIENT LIPID ANSWER: YES
FASTING STATUS PATIENT QL REPORTED: YES
GLOBULIN PLAS-MCNC: 4 G/DL (ref 2.8–4.4)
GLUCOSE BLD-MCNC: 273 MG/DL (ref 70–99)
HBA1C MFR BLD: 9.7 % (ref ?–5.7)
HCT VFR BLD AUTO: 44 %
HDLC SERPL-MCNC: 37 MG/DL (ref 40–59)
HGB BLD-MCNC: 14.4 G/DL
LDLC SERPL CALC-MCNC: 102 MG/DL (ref ?–100)
MCH RBC QN AUTO: 29.1 PG (ref 26–34)
MCHC RBC AUTO-ENTMCNC: 32.7 G/DL (ref 31–37)
MCV RBC AUTO: 88.9 FL
MICROALBUMIN UR-MCNC: 2.63 MG/DL
MICROALBUMIN/CREAT 24H UR-RTO: 9.1 UG/MG (ref ?–30)
NONHDLC SERPL-MCNC: 132 MG/DL (ref ?–130)
OSMOLALITY SERPL CALC.SUM OF ELEC: 293 MOSM/KG (ref 275–295)
PLATELET # BLD AUTO: 235 10(3)UL (ref 150–450)
POTASSIUM SERPL-SCNC: 4.3 MMOL/L (ref 3.5–5.1)
PROT SERPL-MCNC: 7.4 G/DL (ref 6.4–8.2)
RBC # BLD AUTO: 4.95 X10(6)UL
SODIUM SERPL-SCNC: 137 MMOL/L (ref 136–145)
TRIGL SERPL-MCNC: 173 MG/DL (ref 30–149)
TSI SER-ACNC: 0.5 MIU/ML (ref 0.36–3.74)
VLDLC SERPL CALC-MCNC: 29 MG/DL (ref 0–30)
WBC # BLD AUTO: 7.4 X10(3) UL (ref 4–11)

## 2022-03-22 PROCEDURE — 85027 COMPLETE CBC AUTOMATED: CPT

## 2022-03-22 PROCEDURE — 80053 COMPREHEN METABOLIC PANEL: CPT

## 2022-03-22 PROCEDURE — 3061F NEG MICROALBUMINURIA REV: CPT | Performed by: INTERNAL MEDICINE

## 2022-03-22 PROCEDURE — 3046F HEMOGLOBIN A1C LEVEL >9.0%: CPT | Performed by: INTERNAL MEDICINE

## 2022-03-22 PROCEDURE — 82570 ASSAY OF URINE CREATININE: CPT

## 2022-03-22 PROCEDURE — 82043 UR ALBUMIN QUANTITATIVE: CPT

## 2022-03-22 PROCEDURE — 36415 COLL VENOUS BLD VENIPUNCTURE: CPT

## 2022-03-22 PROCEDURE — 80061 LIPID PANEL: CPT

## 2022-03-22 PROCEDURE — 83036 HEMOGLOBIN GLYCOSYLATED A1C: CPT

## 2022-03-22 PROCEDURE — 84443 ASSAY THYROID STIM HORMONE: CPT

## 2022-03-28 ENCOUNTER — OFFICE VISIT (OUTPATIENT)
Dept: INTERNAL MEDICINE CLINIC | Facility: CLINIC | Age: 67
End: 2022-03-28
Payer: COMMERCIAL

## 2022-03-28 VITALS
HEIGHT: 74 IN | SYSTOLIC BLOOD PRESSURE: 154 MMHG | BODY MASS INDEX: 32.98 KG/M2 | DIASTOLIC BLOOD PRESSURE: 82 MMHG | HEART RATE: 82 BPM | WEIGHT: 257 LBS

## 2022-03-28 DIAGNOSIS — E11.9 TYPE 2 DIABETES MELLITUS WITHOUT COMPLICATION, WITHOUT LONG-TERM CURRENT USE OF INSULIN (HCC): Primary | ICD-10-CM

## 2022-03-28 DIAGNOSIS — Z86.010 HX OF ADENOMATOUS COLONIC POLYPS: ICD-10-CM

## 2022-03-28 DIAGNOSIS — E78.5 DYSLIPIDEMIA ASSOCIATED WITH TYPE 2 DIABETES MELLITUS (HCC): ICD-10-CM

## 2022-03-28 DIAGNOSIS — I10 ESSENTIAL HYPERTENSION: ICD-10-CM

## 2022-03-28 DIAGNOSIS — E11.69 DYSLIPIDEMIA ASSOCIATED WITH TYPE 2 DIABETES MELLITUS (HCC): ICD-10-CM

## 2022-03-28 PROCEDURE — 3077F SYST BP >= 140 MM HG: CPT | Performed by: INTERNAL MEDICINE

## 2022-03-28 PROCEDURE — 3079F DIAST BP 80-89 MM HG: CPT | Performed by: INTERNAL MEDICINE

## 2022-03-28 PROCEDURE — 3008F BODY MASS INDEX DOCD: CPT | Performed by: INTERNAL MEDICINE

## 2022-03-28 PROCEDURE — 99214 OFFICE O/P EST MOD 30 MIN: CPT | Performed by: INTERNAL MEDICINE

## 2022-03-28 RX ORDER — ATORVASTATIN CALCIUM 20 MG/1
20 TABLET, FILM COATED ORAL NIGHTLY
Qty: 90 TABLET | Refills: 1 | Status: SHIPPED | OUTPATIENT
Start: 2022-03-28

## 2022-03-28 RX ORDER — LOSARTAN POTASSIUM AND HYDROCHLOROTHIAZIDE 12.5; 5 MG/1; MG/1
1 TABLET ORAL DAILY
Qty: 90 TABLET | Refills: 1 | Status: SHIPPED | OUTPATIENT
Start: 2022-03-28 | End: 2023-03-23

## 2022-03-28 NOTE — PATIENT INSTRUCTIONS
Begin Metformin 500 mg 1 pill twice daily with meals. Begin losartan/HCTZ 50/12.5 mg 1 tablet daily. Begin atorvastatin 20 mg 1 tablet daily. Please schedule an eye exam.  Return visit in 1 month for a blood pressure check.

## 2022-04-08 NOTE — TELEPHONE ENCOUNTER
Dr. Riya Rust,    Patient is now back on Losartan once daily and Metformin twice a day after seeing Dr. Marcelo Cool. Please advise on orders for colonoscopy procedure on 5/2/22. GI Schedulers--    Can you please create a surgical case request for patient's cln procedure, do not see that it was created previously. Thank you!

## 2022-04-08 NOTE — TELEPHONE ENCOUNTER
Patients colonoscopy scheduled in casetabs  And prep to be mailed to patients home on 04/08/2022   Patient is aware of holds for his medications when called patient stated he was sleeping works nights and would appreciate the updated prep to be mailed out   Advised patient to create a my chart account also       Rescheduled for:  Colonoscopy [id number]  Provider Name:  Dr Kenrick Savage  Date: 05/02/2022    Location:[facility]    Case added in case tabs   Sedation:  MAC  Time: 1000 patient is aware that [facility] will call with time    Prep: trilyte prep instructions to be mailed to patient     Meds/Allergies Reconciled?:  Physician reviewed   Diagnosis with codes:  Hx of colon polyps Z86.010  Was patient informed to call insurance with codes (Y/N):  Yes, I confirmed BCBS PPO insurance with the patient. Referral sent?:  yes  [facility] or [facility] notified?:  I sent an electronic request to Endo Scheduling and received a confirmation today. Medication Orders: This patient verbally confirmed that he is not taking:   Iron, blood thinners, BP meds, and is not diabetic   Not latex allergy, Not PCN allergy and does not have a pacemaker  Patient is aware to hold his metformin medication the day before and the day of procedure  Patient is aware to hold his losartan medication the day of procedure       Misc Orders:  Patient is aware she will be scheduled for a covid 19 test prior to procedure        Further instructions given by staff:     This patient had no questions regarding the prep instructions

## 2022-04-08 NOTE — TELEPHONE ENCOUNTER
Noted, please have patient hold metformin day before and day of procedure. Hold losartan-hydrochlorothiazide the day of procedure. Okay for aspirin to continue.

## 2022-05-02 ENCOUNTER — SURGERY CENTER DOCUMENTATION (OUTPATIENT)
Dept: SURGERY | Age: 67
End: 2022-05-02

## 2022-05-11 ENCOUNTER — TELEPHONE (OUTPATIENT)
Dept: GASTROENTEROLOGY | Facility: CLINIC | Age: 67
End: 2022-05-11

## 2022-05-11 NOTE — TELEPHONE ENCOUNTER
Health maintenance updated. Last colonoscopy done 5/2/22. 5 year recall placed into Pt Outreach, next due on 5/2/27 per Dr. Luba Fong.

## 2022-05-16 ENCOUNTER — OFFICE VISIT (OUTPATIENT)
Dept: INTERNAL MEDICINE CLINIC | Facility: CLINIC | Age: 67
End: 2022-05-16
Payer: COMMERCIAL

## 2022-05-16 VITALS
WEIGHT: 261.13 LBS | DIASTOLIC BLOOD PRESSURE: 88 MMHG | BODY MASS INDEX: 33.51 KG/M2 | HEART RATE: 72 BPM | SYSTOLIC BLOOD PRESSURE: 144 MMHG | HEIGHT: 74 IN

## 2022-05-16 DIAGNOSIS — I10 ESSENTIAL HYPERTENSION: ICD-10-CM

## 2022-05-16 DIAGNOSIS — E11.9 TYPE 2 DIABETES MELLITUS WITHOUT COMPLICATION, WITHOUT LONG-TERM CURRENT USE OF INSULIN (HCC): Primary | ICD-10-CM

## 2022-05-16 DIAGNOSIS — E78.5 DYSLIPIDEMIA ASSOCIATED WITH TYPE 2 DIABETES MELLITUS (HCC): ICD-10-CM

## 2022-05-16 DIAGNOSIS — E11.69 DYSLIPIDEMIA ASSOCIATED WITH TYPE 2 DIABETES MELLITUS (HCC): ICD-10-CM

## 2022-05-16 PROCEDURE — 3008F BODY MASS INDEX DOCD: CPT | Performed by: INTERNAL MEDICINE

## 2022-05-16 PROCEDURE — 99214 OFFICE O/P EST MOD 30 MIN: CPT | Performed by: INTERNAL MEDICINE

## 2022-05-16 PROCEDURE — 3079F DIAST BP 80-89 MM HG: CPT | Performed by: INTERNAL MEDICINE

## 2022-05-16 PROCEDURE — 3077F SYST BP >= 140 MM HG: CPT | Performed by: INTERNAL MEDICINE

## 2022-05-16 RX ORDER — LOSARTAN POTASSIUM AND HYDROCHLOROTHIAZIDE 12.5; 1 MG/1; MG/1
1 TABLET ORAL DAILY
Qty: 90 TABLET | Refills: 1 | Status: SHIPPED | OUTPATIENT
Start: 2022-05-16

## 2022-05-16 NOTE — PATIENT INSTRUCTIONS
Your blood pressure today was slightly high at 144/88. Please change losartan/HCTZ to 100/12.5 mg 1 tablet daily. Continue other medications. Repeat blood tests at the end of June. Return visit in 4-6 weeks for a blood pressure check.

## 2022-06-27 ENCOUNTER — LAB ENCOUNTER (OUTPATIENT)
Dept: LAB | Facility: REFERENCE LAB | Age: 67
End: 2022-06-27
Attending: INTERNAL MEDICINE
Payer: COMMERCIAL

## 2022-06-27 DIAGNOSIS — E11.69 DYSLIPIDEMIA ASSOCIATED WITH TYPE 2 DIABETES MELLITUS (HCC): ICD-10-CM

## 2022-06-27 DIAGNOSIS — E78.5 DYSLIPIDEMIA ASSOCIATED WITH TYPE 2 DIABETES MELLITUS (HCC): ICD-10-CM

## 2022-06-27 DIAGNOSIS — E11.9 TYPE 2 DIABETES MELLITUS WITHOUT COMPLICATION, WITHOUT LONG-TERM CURRENT USE OF INSULIN (HCC): ICD-10-CM

## 2022-06-27 LAB
ALT SERPL-CCNC: 24 U/L
AST SERPL-CCNC: 17 U/L (ref 15–37)
CHOLEST SERPL-MCNC: 111 MG/DL (ref ?–200)
EST. AVERAGE GLUCOSE BLD GHB EST-MCNC: 197 MG/DL (ref 68–126)
FASTING PATIENT LIPID ANSWER: YES
HBA1C MFR BLD: 8.5 % (ref ?–5.7)
HDLC SERPL-MCNC: 38 MG/DL (ref 40–59)
LDLC SERPL CALC-MCNC: 48 MG/DL (ref ?–100)
NONHDLC SERPL-MCNC: 73 MG/DL (ref ?–130)
TRIGL SERPL-MCNC: 146 MG/DL (ref 30–149)
VLDLC SERPL CALC-MCNC: 21 MG/DL (ref 0–30)

## 2022-06-27 PROCEDURE — 83036 HEMOGLOBIN GLYCOSYLATED A1C: CPT

## 2022-06-27 PROCEDURE — 84450 TRANSFERASE (AST) (SGOT): CPT

## 2022-06-27 PROCEDURE — 36415 COLL VENOUS BLD VENIPUNCTURE: CPT

## 2022-06-27 PROCEDURE — 80061 LIPID PANEL: CPT

## 2022-06-27 PROCEDURE — 84460 ALANINE AMINO (ALT) (SGPT): CPT

## 2022-06-27 PROCEDURE — 3052F HG A1C>EQUAL 8.0%<EQUAL 9.0%: CPT | Performed by: NURSE PRACTITIONER

## 2022-06-28 NOTE — ASSESSMENT & PLAN NOTE
----- Message from Estephania Berger sent at 6/27/2022 11:57 AM CDT -----  Contact: @[phone]  Pt requesting a call back from Derm and Allergy to schedule a new patient appt for a Rash located on Face and the other on pt Butt.  Pt states the were Red, Raised and Itchy but last longer that 24 hours on Butt area.  Pt also states she would like to have an Allergy Test.  I attempted to schedule with both specialities with no availability to the calendar.  Please call to discuss further.     Blood pressure controlled. Amlodipine 5 mg was started last office visit. Changing to losartan 25 mg  due to diabetes. Goal blood pressure less than 130/80. Check blood pressure at home. Titrate losartan to 50 mg if needed. Reassess in 4 weeks.   Co

## 2022-07-11 ENCOUNTER — OFFICE VISIT (OUTPATIENT)
Dept: FAMILY MEDICINE CLINIC | Facility: CLINIC | Age: 67
End: 2022-07-11
Payer: COMMERCIAL

## 2022-07-11 VITALS
SYSTOLIC BLOOD PRESSURE: 153 MMHG | HEART RATE: 75 BPM | HEIGHT: 74 IN | WEIGHT: 261 LBS | TEMPERATURE: 98 F | RESPIRATION RATE: 18 BRPM | DIASTOLIC BLOOD PRESSURE: 88 MMHG | OXYGEN SATURATION: 97 % | BODY MASS INDEX: 33.5 KG/M2

## 2022-07-11 DIAGNOSIS — M54.50 ACUTE RIGHT-SIDED LOW BACK PAIN WITHOUT SCIATICA: Primary | ICD-10-CM

## 2022-07-11 PROCEDURE — 3008F BODY MASS INDEX DOCD: CPT | Performed by: NURSE PRACTITIONER

## 2022-07-11 PROCEDURE — 3079F DIAST BP 80-89 MM HG: CPT | Performed by: NURSE PRACTITIONER

## 2022-07-11 PROCEDURE — 99202 OFFICE O/P NEW SF 15 MIN: CPT | Performed by: NURSE PRACTITIONER

## 2022-07-11 PROCEDURE — 3077F SYST BP >= 140 MM HG: CPT | Performed by: NURSE PRACTITIONER

## 2022-07-11 RX ORDER — NAPROXEN 500 MG/1
500 TABLET ORAL 2 TIMES DAILY WITH MEALS
Qty: 10 TABLET | Refills: 0 | Status: SHIPPED | OUTPATIENT
Start: 2022-07-11 | End: 2022-07-16

## 2022-07-11 RX ORDER — CYCLOBENZAPRINE HCL 10 MG
10 TABLET ORAL 2 TIMES DAILY PRN
Qty: 12 TABLET | Refills: 1 | Status: SHIPPED | OUTPATIENT
Start: 2022-07-11 | End: 2022-07-17

## 2022-09-21 RX ORDER — ATORVASTATIN CALCIUM 20 MG/1
20 TABLET, FILM COATED ORAL NIGHTLY
Qty: 90 TABLET | Refills: 0 | Status: SHIPPED | OUTPATIENT
Start: 2022-09-21

## 2022-09-23 NOTE — TELEPHONE ENCOUNTER
Spoke with pt and MD message below given. Pt verb understanding  Pt scheduled f/u 9/26/22 with Dr Jamshid Farrell.   New address provided to pt

## 2022-09-26 ENCOUNTER — OFFICE VISIT (OUTPATIENT)
Dept: INTERNAL MEDICINE CLINIC | Facility: CLINIC | Age: 67
End: 2022-09-26

## 2022-09-26 VITALS
SYSTOLIC BLOOD PRESSURE: 134 MMHG | WEIGHT: 258.38 LBS | BODY MASS INDEX: 33.16 KG/M2 | DIASTOLIC BLOOD PRESSURE: 72 MMHG | HEART RATE: 75 BPM | HEIGHT: 74 IN

## 2022-09-26 DIAGNOSIS — I10 ESSENTIAL HYPERTENSION: ICD-10-CM

## 2022-09-26 DIAGNOSIS — E11.9 TYPE 2 DIABETES MELLITUS WITHOUT COMPLICATION, WITHOUT LONG-TERM CURRENT USE OF INSULIN (HCC): Primary | ICD-10-CM

## 2022-09-26 PROCEDURE — 99214 OFFICE O/P EST MOD 30 MIN: CPT | Performed by: INTERNAL MEDICINE

## 2022-09-26 PROCEDURE — 3008F BODY MASS INDEX DOCD: CPT | Performed by: INTERNAL MEDICINE

## 2022-09-26 PROCEDURE — 3078F DIAST BP <80 MM HG: CPT | Performed by: INTERNAL MEDICINE

## 2022-09-26 PROCEDURE — 3075F SYST BP GE 130 - 139MM HG: CPT | Performed by: INTERNAL MEDICINE

## 2022-09-26 NOTE — PATIENT INSTRUCTIONS
Your blood pressure today was excellent at 134/72. Please increase metformin to 1000 mg twice daily with meals. Continue other medications. Continue healthy diet and try to exercise regularly. Please schedule a physical in 4 months.

## 2022-10-11 RX ORDER — ATORVASTATIN CALCIUM 20 MG/1
20 TABLET, FILM COATED ORAL NIGHTLY
Qty: 90 TABLET | Refills: 1 | Status: SHIPPED | OUTPATIENT
Start: 2022-10-11

## 2022-10-11 RX ORDER — LOSARTAN POTASSIUM AND HYDROCHLOROTHIAZIDE 12.5; 1 MG/1; MG/1
TABLET ORAL
Qty: 90 TABLET | Refills: 1 | Status: SHIPPED | OUTPATIENT
Start: 2022-10-11

## 2023-01-16 ENCOUNTER — OFFICE VISIT (OUTPATIENT)
Dept: INTERNAL MEDICINE CLINIC | Facility: CLINIC | Age: 68
End: 2023-01-16

## 2023-01-16 VITALS
SYSTOLIC BLOOD PRESSURE: 134 MMHG | WEIGHT: 236.63 LBS | HEART RATE: 94 BPM | HEIGHT: 74 IN | DIASTOLIC BLOOD PRESSURE: 64 MMHG | BODY MASS INDEX: 30.37 KG/M2

## 2023-01-16 DIAGNOSIS — Z86.010 HX OF ADENOMATOUS COLONIC POLYPS: ICD-10-CM

## 2023-01-16 DIAGNOSIS — I10 ESSENTIAL HYPERTENSION: ICD-10-CM

## 2023-01-16 DIAGNOSIS — E11.42 PERIPHERAL SENSORY NEUROPATHY DUE TO TYPE 2 DIABETES MELLITUS (HCC): ICD-10-CM

## 2023-01-16 DIAGNOSIS — M25.562 PAIN IN BOTH KNEES, UNSPECIFIED CHRONICITY: ICD-10-CM

## 2023-01-16 DIAGNOSIS — E11.40 TYPE 2 DIABETES MELLITUS WITH DIABETIC NEUROPATHY, WITHOUT LONG-TERM CURRENT USE OF INSULIN (HCC): ICD-10-CM

## 2023-01-16 DIAGNOSIS — M25.561 PAIN IN BOTH KNEES, UNSPECIFIED CHRONICITY: ICD-10-CM

## 2023-01-16 DIAGNOSIS — E78.5 DYSLIPIDEMIA ASSOCIATED WITH TYPE 2 DIABETES MELLITUS (HCC): ICD-10-CM

## 2023-01-16 DIAGNOSIS — Z00.00 ANNUAL PHYSICAL EXAM: Primary | ICD-10-CM

## 2023-01-16 DIAGNOSIS — E11.69 DYSLIPIDEMIA ASSOCIATED WITH TYPE 2 DIABETES MELLITUS (HCC): ICD-10-CM

## 2023-01-16 PROCEDURE — 3075F SYST BP GE 130 - 139MM HG: CPT | Performed by: INTERNAL MEDICINE

## 2023-01-16 PROCEDURE — 99397 PER PM REEVAL EST PAT 65+ YR: CPT | Performed by: INTERNAL MEDICINE

## 2023-01-16 PROCEDURE — 3078F DIAST BP <80 MM HG: CPT | Performed by: INTERNAL MEDICINE

## 2023-01-16 PROCEDURE — 3008F BODY MASS INDEX DOCD: CPT | Performed by: INTERNAL MEDICINE

## 2023-01-16 NOTE — PATIENT INSTRUCTIONS
Your blood pressure today was good at 134/64. Please get the newest COVID booster soon. Come in soon when you can for blood and urine testing. Please get x-rays of both knees and schedule an appointment with Orthopedics. Continue current medications. Continue healthy diet and regular exercise. Return visit in 6 months.

## 2023-01-20 ENCOUNTER — LAB ENCOUNTER (OUTPATIENT)
Dept: LAB | Facility: HOSPITAL | Age: 68
End: 2023-01-20
Attending: INTERNAL MEDICINE
Payer: COMMERCIAL

## 2023-01-20 ENCOUNTER — HOSPITAL ENCOUNTER (OUTPATIENT)
Dept: GENERAL RADIOLOGY | Facility: HOSPITAL | Age: 68
Discharge: HOME | End: 2023-01-20
Attending: INTERNAL MEDICINE
Payer: COMMERCIAL

## 2023-01-20 DIAGNOSIS — M25.561 PAIN IN BOTH KNEES, UNSPECIFIED CHRONICITY: ICD-10-CM

## 2023-01-20 DIAGNOSIS — M25.562 PAIN IN BOTH KNEES, UNSPECIFIED CHRONICITY: ICD-10-CM

## 2023-01-20 DIAGNOSIS — Z00.00 ANNUAL PHYSICAL EXAM: ICD-10-CM

## 2023-01-20 LAB
ALBUMIN SERPL-MCNC: 3.8 G/DL (ref 3.4–5)
ALBUMIN/GLOB SERPL: 0.9 {RATIO} (ref 1–2)
ALP LIVER SERPL-CCNC: 96 U/L
ALT SERPL-CCNC: 18 U/L
ANION GAP SERPL CALC-SCNC: 6 MMOL/L (ref 0–18)
AST SERPL-CCNC: 9 U/L (ref 15–37)
BILIRUB SERPL-MCNC: 0.8 MG/DL (ref 0.1–2)
BUN BLD-MCNC: 19 MG/DL (ref 7–18)
BUN/CREAT SERPL: 16.5 (ref 10–20)
CALCIUM BLD-MCNC: 9.3 MG/DL (ref 8.5–10.1)
CHLORIDE SERPL-SCNC: 101 MMOL/L (ref 98–112)
CHOLEST SERPL-MCNC: 106 MG/DL (ref ?–200)
CO2 SERPL-SCNC: 29 MMOL/L (ref 21–32)
COMPLEXED PSA SERPL-MCNC: 1.55 NG/ML (ref ?–4)
CREAT BLD-MCNC: 1.15 MG/DL
CREAT UR-SCNC: 246 MG/DL
DEPRECATED RDW RBC AUTO: 40 FL (ref 35.1–46.3)
ERYTHROCYTE [DISTWIDTH] IN BLOOD BY AUTOMATED COUNT: 12.2 % (ref 11–15)
EST. AVERAGE GLUCOSE BLD GHB EST-MCNC: 134 MG/DL (ref 68–126)
FASTING PATIENT LIPID ANSWER: YES
FASTING STATUS PATIENT QL REPORTED: YES
GFR SERPLBLD BASED ON 1.73 SQ M-ARVRAT: 70 ML/MIN/1.73M2 (ref 60–?)
GLOBULIN PLAS-MCNC: 4.4 G/DL (ref 2.8–4.4)
GLUCOSE BLD-MCNC: 130 MG/DL (ref 70–99)
HBA1C MFR BLD: 6.3 % (ref ?–5.7)
HCT VFR BLD AUTO: 38.6 %
HDLC SERPL-MCNC: 44 MG/DL (ref 40–59)
HGB BLD-MCNC: 12.5 G/DL
LDLC SERPL CALC-MCNC: 44 MG/DL (ref ?–100)
MCH RBC QN AUTO: 28.7 PG (ref 26–34)
MCHC RBC AUTO-ENTMCNC: 32.4 G/DL (ref 31–37)
MCV RBC AUTO: 88.7 FL
MICROALBUMIN UR-MCNC: 2.6 MG/DL
MICROALBUMIN/CREAT 24H UR-RTO: 10.6 UG/MG (ref ?–30)
NONHDLC SERPL-MCNC: 62 MG/DL (ref ?–130)
OSMOLALITY SERPL CALC.SUM OF ELEC: 286 MOSM/KG (ref 275–295)
PLATELET # BLD AUTO: 296 10(3)UL (ref 150–450)
POTASSIUM SERPL-SCNC: 3.7 MMOL/L (ref 3.5–5.1)
PROT SERPL-MCNC: 8.2 G/DL (ref 6.4–8.2)
RBC # BLD AUTO: 4.35 X10(6)UL
SODIUM SERPL-SCNC: 136 MMOL/L (ref 136–145)
TRIGL SERPL-MCNC: 97 MG/DL (ref 30–149)
VLDLC SERPL CALC-MCNC: 14 MG/DL (ref 0–30)
WBC # BLD AUTO: 10 X10(3) UL (ref 4–11)

## 2023-01-20 PROCEDURE — 80053 COMPREHEN METABOLIC PANEL: CPT

## 2023-01-20 PROCEDURE — 73565 X-RAY EXAM OF KNEES: CPT | Performed by: INTERNAL MEDICINE

## 2023-01-20 PROCEDURE — 83036 HEMOGLOBIN GLYCOSYLATED A1C: CPT

## 2023-01-20 PROCEDURE — 82043 UR ALBUMIN QUANTITATIVE: CPT

## 2023-01-20 PROCEDURE — 36415 COLL VENOUS BLD VENIPUNCTURE: CPT

## 2023-01-20 PROCEDURE — 80061 LIPID PANEL: CPT

## 2023-01-20 PROCEDURE — 82570 ASSAY OF URINE CREATININE: CPT

## 2023-01-20 PROCEDURE — 85027 COMPLETE CBC AUTOMATED: CPT

## 2023-02-14 ENCOUNTER — OFFICE VISIT (OUTPATIENT)
Dept: ORTHOPEDICS CLINIC | Facility: CLINIC | Age: 68
End: 2023-02-14

## 2023-02-14 VITALS — HEART RATE: 83 BPM | DIASTOLIC BLOOD PRESSURE: 80 MMHG | SYSTOLIC BLOOD PRESSURE: 135 MMHG

## 2023-02-14 DIAGNOSIS — G89.29 CHRONIC PAIN OF BOTH KNEES: ICD-10-CM

## 2023-02-14 DIAGNOSIS — M25.561 CHRONIC PAIN OF BOTH KNEES: ICD-10-CM

## 2023-02-14 DIAGNOSIS — M25.562 CHRONIC PAIN OF BOTH KNEES: ICD-10-CM

## 2023-02-14 DIAGNOSIS — M17.11 PRIMARY OSTEOARTHRITIS OF RIGHT KNEE: ICD-10-CM

## 2023-02-14 DIAGNOSIS — R22.42 MASS OF LEFT KNEE: ICD-10-CM

## 2023-02-14 DIAGNOSIS — M17.12 PRIMARY OSTEOARTHRITIS OF LEFT KNEE: Primary | ICD-10-CM

## 2023-02-14 PROCEDURE — 20610 DRAIN/INJ JOINT/BURSA W/O US: CPT

## 2023-02-14 PROCEDURE — 3079F DIAST BP 80-89 MM HG: CPT | Performed by: ORTHOPAEDIC SURGERY

## 2023-02-14 PROCEDURE — 99244 OFF/OP CNSLTJ NEW/EST MOD 40: CPT | Performed by: ORTHOPAEDIC SURGERY

## 2023-02-14 PROCEDURE — 3075F SYST BP GE 130 - 139MM HG: CPT | Performed by: ORTHOPAEDIC SURGERY

## 2023-02-14 RX ORDER — TRIAMCINOLONE ACETONIDE 40 MG/ML
40 INJECTION, SUSPENSION INTRA-ARTICULAR; INTRAMUSCULAR ONCE
Status: COMPLETED | OUTPATIENT
Start: 2023-02-14 | End: 2023-02-14

## 2023-02-14 RX ADMIN — TRIAMCINOLONE ACETONIDE 40 MG: 40 INJECTION, SUSPENSION INTRA-ARTICULAR; INTRAMUSCULAR at 18:26:00

## 2023-02-15 NOTE — PROGRESS NOTES
Per verbal order from ISIAH Nathan, draw up 5ml of 0.5% Marcaine and 1ml of Kenalog 40 for cortisone injection to left knee. Tatyana Nava  Patient provided education handout for cortisone injection.

## 2023-03-02 ENCOUNTER — HOSPITAL ENCOUNTER (OUTPATIENT)
Dept: ULTRASOUND IMAGING | Facility: HOSPITAL | Age: 68
Discharge: HOME | End: 2023-03-02
Attending: ORTHOPAEDIC SURGERY
Payer: COMMERCIAL

## 2023-03-02 DIAGNOSIS — R22.42 MASS OF LEFT KNEE: ICD-10-CM

## 2023-03-02 PROCEDURE — 76882 US LMTD JT/FCL EVL NVASC XTR: CPT | Performed by: ORTHOPAEDIC SURGERY

## 2023-03-07 ENCOUNTER — HOSPITAL ENCOUNTER (OUTPATIENT)
Dept: GENERAL RADIOLOGY | Facility: HOSPITAL | Age: 68
Discharge: HOME | End: 2023-03-07
Attending: ORTHOPAEDIC SURGERY
Payer: COMMERCIAL

## 2023-03-07 ENCOUNTER — OFFICE VISIT (OUTPATIENT)
Dept: ORTHOPEDICS CLINIC | Facility: CLINIC | Age: 68
End: 2023-03-07
Payer: COMMERCIAL

## 2023-03-07 DIAGNOSIS — Z47.89 ORTHOPEDIC AFTERCARE: ICD-10-CM

## 2023-03-07 DIAGNOSIS — M25.562 CHRONIC PAIN OF BOTH KNEES: ICD-10-CM

## 2023-03-07 DIAGNOSIS — M25.561 CHRONIC PAIN OF BOTH KNEES: ICD-10-CM

## 2023-03-07 DIAGNOSIS — M17.11 PRIMARY OSTEOARTHRITIS OF RIGHT KNEE: ICD-10-CM

## 2023-03-07 DIAGNOSIS — G89.29 CHRONIC PAIN OF BOTH KNEES: ICD-10-CM

## 2023-03-07 DIAGNOSIS — M17.12 PRIMARY OSTEOARTHRITIS OF LEFT KNEE: Primary | ICD-10-CM

## 2023-03-07 DIAGNOSIS — R22.42 MASS OF LEFT KNEE: ICD-10-CM

## 2023-03-07 PROCEDURE — 99212 OFFICE O/P EST SF 10 MIN: CPT

## 2023-03-07 PROCEDURE — 73562 X-RAY EXAM OF KNEE 3: CPT | Performed by: ORTHOPAEDIC SURGERY

## 2023-03-07 RX ORDER — MELOXICAM 15 MG/1
15 TABLET ORAL DAILY
Qty: 15 TABLET | Refills: 0 | Status: SHIPPED | OUTPATIENT
Start: 2023-03-07

## 2023-03-27 ENCOUNTER — TELEPHONE (OUTPATIENT)
Dept: ORTHOPEDICS CLINIC | Facility: CLINIC | Age: 68
End: 2023-03-27

## 2023-03-27 NOTE — TELEPHONE ENCOUNTER
Patient requesting call back with status on prior authorization for Cortizone injections.  Please advise

## 2023-04-03 NOTE — TELEPHONE ENCOUNTER
Per last update from prior auth team     Sent: 3/29/2023   1:16 PM CDT  \"PA is currently pending with the patient's labor fund.  \"    Also provided patient with [facility] number per his request.

## 2023-04-19 NOTE — TELEPHONE ENCOUNTER
Bilateral Durolane has been approved per fax received from [facility]. Auth # [id number] is valid until 7/17/2023. Okay to schedule.

## 2023-04-19 NOTE — TELEPHONE ENCOUNTER
Called pt LMTCB. If pt CB you may offer to schedule him for Durolane gel injections- 2 appts will be needed, one per knee. These appts can be scheduled with Padma Bryan if he wants to get in sooner than next available.

## 2023-05-01 ENCOUNTER — OFFICE VISIT (OUTPATIENT)
Dept: ORTHOPEDICS CLINIC | Facility: CLINIC | Age: 68
End: 2023-05-01

## 2023-05-01 VITALS — DIASTOLIC BLOOD PRESSURE: 84 MMHG | SYSTOLIC BLOOD PRESSURE: 136 MMHG | HEART RATE: 84 BPM

## 2023-05-01 DIAGNOSIS — G89.29 CHRONIC PAIN OF LEFT KNEE: ICD-10-CM

## 2023-05-01 DIAGNOSIS — M17.12 PRIMARY OSTEOARTHRITIS OF LEFT KNEE: Primary | ICD-10-CM

## 2023-05-01 DIAGNOSIS — M25.562 CHRONIC PAIN OF LEFT KNEE: ICD-10-CM

## 2023-05-01 RX ORDER — MELOXICAM 15 MG/1
15 TABLET ORAL DAILY
Qty: 15 TABLET | Refills: 0 | Status: SHIPPED | OUTPATIENT
Start: 2023-05-01

## 2023-05-23 RX ORDER — LOSARTAN POTASSIUM AND HYDROCHLOROTHIAZIDE 12.5; 1 MG/1; MG/1
TABLET ORAL
Qty: 90 TABLET | Refills: 1 | Status: SHIPPED | OUTPATIENT
Start: 2023-05-23

## 2023-06-21 RX ORDER — ATORVASTATIN CALCIUM 20 MG/1
TABLET, FILM COATED ORAL
Qty: 90 TABLET | Refills: 1 | Status: SHIPPED | OUTPATIENT
Start: 2023-06-21

## 2023-11-26 RX ORDER — LOSARTAN POTASSIUM AND HYDROCHLOROTHIAZIDE 12.5; 1 MG/1; MG/1
1 TABLET ORAL DAILY
Qty: 90 TABLET | Refills: 0 | Status: SHIPPED | OUTPATIENT
Start: 2023-11-26

## 2023-11-27 NOTE — TELEPHONE ENCOUNTER
1st attempt/left voice mail message for pt to call back. Please, see message below when the call is returned.

## 2024-01-01 RX ORDER — ATORVASTATIN CALCIUM 20 MG/1
20 TABLET, FILM COATED ORAL NIGHTLY
Qty: 90 TABLET | Refills: 3 | Status: SHIPPED | OUTPATIENT
Start: 2024-01-01

## 2024-01-01 NOTE — TELEPHONE ENCOUNTER
Refill passed per [place] [employer] [facility] protocol.     Requested Prescriptions   Pending Prescriptions Disp Refills    ATORVASTATIN 20 MG Oral Tab [Pharmacy Med Name: ATORVASTATIN 20 MG TABLET] 90 tablet 1     Sig: TAKE 1 TABLET BY MOUTH EVERY DAY AT NIGHT       Cholesterol Medication Protocol Passed - 12/31/2023  7:08 AM        Passed - ALT in past 12 months        Passed - LDL in past 12 months        Passed - Last ALT < 80     Lab Results   Component Value Date    ALT 18 01/20/2023             Passed - Last LDL < 130     Lab Results   Component Value Date    LDL 44 01/20/2023             Passed - In person appointment or virtual visit in the past 12 mos or appointment in next 3 mos     Recent Outpatient Visits              8 months ago Primary osteoarthritis of left knee    [facility], [address], Jackie Agud[place], [place]    Office Visit    10 months ago Primary osteoarthritis of left knee    [facility], [address], [place]io [place], [place]    Office Visit    10 months ago Primary osteoarthritis of left knee    [facility], [address], Marielena Silva MD    Office Visit    11 months ago Annual physical exam    Peg Rockwell MD    Office Visit    1 year ago Type 2 diabetes mellitus without complication, without long-term current use of insulin [facility])    [address] [place] [address], Mattie Durand MD    Office Visit                           Recent Outpatient Visits              8 months ago Primary osteoarthritis of left knee    [facility], [address], [place], [place]    Office Visit    10 months ago Primary osteoarthritis of left knee    [facility], [address], Nava Quispe, [place]    Office Visit    10 months ago Primary osteoarthritis of left knee Koko Estevez MD    Office Visit    11 months ago Annual physical exam    Ryan Reynolds MD    Office Visit    1 year ago Type 2 diabetes mellitus without complication, without long-term current use of insulin [facility])    Cindy Dear, Nitish Manning MD    Office Visit

## 2024-02-03 PROBLEM — M17.0 PRIMARY OSTEOARTHRITIS OF KNEES, BILATERAL: Status: ACTIVE | Noted: 2024-02-03

## 2024-02-05 ENCOUNTER — TELEPHONE (OUTPATIENT)
Dept: INTERNAL MEDICINE CLINIC | Facility: CLINIC | Age: 69
End: 2024-02-05

## 2024-02-05 ENCOUNTER — OFFICE VISIT (OUTPATIENT)
Dept: INTERNAL MEDICINE CLINIC | Facility: CLINIC | Age: 69
End: 2024-02-05

## 2024-02-05 VITALS
HEIGHT: 74 IN | WEIGHT: 254 LBS | SYSTOLIC BLOOD PRESSURE: 136 MMHG | HEART RATE: 91 BPM | BODY MASS INDEX: 32.6 KG/M2 | DIASTOLIC BLOOD PRESSURE: 66 MMHG

## 2024-02-05 DIAGNOSIS — E78.5 DYSLIPIDEMIA ASSOCIATED WITH TYPE 2 DIABETES MELLITUS  (HCC): ICD-10-CM

## 2024-02-05 DIAGNOSIS — Z86.010 HX OF ADENOMATOUS COLONIC POLYPS: ICD-10-CM

## 2024-02-05 DIAGNOSIS — E11.42 PERIPHERAL SENSORY NEUROPATHY DUE TO TYPE 2 DIABETES MELLITUS (HCC): ICD-10-CM

## 2024-02-05 DIAGNOSIS — E11.40 TYPE 2 DIABETES MELLITUS WITH DIABETIC NEUROPATHY, WITHOUT LONG-TERM CURRENT USE OF INSULIN (HCC): ICD-10-CM

## 2024-02-05 DIAGNOSIS — E11.69 DYSLIPIDEMIA ASSOCIATED WITH TYPE 2 DIABETES MELLITUS  (HCC): ICD-10-CM

## 2024-02-05 DIAGNOSIS — Z00.00 ANNUAL PHYSICAL EXAM: Primary | ICD-10-CM

## 2024-02-05 DIAGNOSIS — I10 ESSENTIAL HYPERTENSION: ICD-10-CM

## 2024-02-05 PROCEDURE — 3078F DIAST BP <80 MM HG: CPT | Performed by: INTERNAL MEDICINE

## 2024-02-05 PROCEDURE — 3075F SYST BP GE 130 - 139MM HG: CPT | Performed by: INTERNAL MEDICINE

## 2024-02-05 PROCEDURE — 99397 PER PM REEVAL EST PAT 65+ YR: CPT | Performed by: INTERNAL MEDICINE

## 2024-02-05 PROCEDURE — 3008F BODY MASS INDEX DOCD: CPT | Performed by: INTERNAL MEDICINE

## 2024-02-05 RX ORDER — ACYCLOVIR 400 MG/1
1 TABLET ORAL
Qty: 3 EACH | Refills: 11 | Status: SHIPPED | OUTPATIENT
Start: 2024-02-05 | End: 2025-02-04

## 2024-02-05 RX ORDER — ACYCLOVIR 400 MG/1
1 TABLET ORAL ONCE
Qty: 1 EACH | Refills: 0 | Status: SHIPPED | OUTPATIENT
Start: 2024-02-05 | End: 2024-02-05

## 2024-02-05 NOTE — PATIENT INSTRUCTIONS
Please come in soon for blood and urine testing.  Continue current medications.  Please try to follow a healthy diet and exercise regularly.  Return visit in 6 months.

## 2024-02-05 NOTE — H&P
Jan Casas is a 69 year old male who presents for a complete physical exam, as well as for follow-up of type 2 diabetes, hypertension and dyslipidemia.  HPI:   His last visit here was for a physical in January 2023.  He is seeing Orthopedics at [facility], and was scheduled for left TKA in January.  However, he has been using a brace on his left knee and now no longer has left knee pain.  He is thinking about postponing surgery.    He feels well.  No particular issues for discussion today.  He does request a prescription for a freestyle alphonso 2 glucose monitor.    No home Accu-Chek or BP monitoring.  He tries to watch his diet, and walks on a treadmill about once a week.  Weight up 18 pounds since physical January 2023.    In January 2023, CMP normal except glucose 130, CBC with hemoglobin 12.5 hematocrit 38.6, glycohemoglobin 6.3%, cholesterol 106 triglycerides 97 HDL 44 LDL 44, PSA normal at 1.55, urine microalbumin normal.  In March 2022 TSH normal at 0.499.  Colonoscopy May 2022 normal except internal hemorrhoids with next colonoscopy recommended in 2027.  Ophthalmology exam April 2023 normal without retinopathy.  Pneumovax March 2019.  Never receives influenza vaccine.  Has received the bivalent COVID booster.    Wt Readings from Last 4 Encounters:   02/05/24 254 lb (115.2 kg)   01/16/23 236 lb 9.6 oz (107.3 kg)   09/26/22 258 lb 6.4 oz (117.2 kg)   07/11/22 261 lb (118.4 kg)     Body mass index is 32.61 kg/m².     Current Outpatient Medications   Medication Sig Dispense Refill    Continuous Blood Gluc  (FREESTYLE ALPHONSO 2 READER) Does not apply Device 1 each every 14 (fourteen) days. Use every 14 days 6 each 3    Continuous Blood Gluc Sensor (FREESTYLE ALPHONSO 2 SENSOR) Does not apply Misc 1 each every 14 (fourteen) days. Use every 14 days 6 each 3    atorvastatin 20 MG Oral Tab Take 1 tablet (20 mg total) by mouth nightly. 90 tablet 3    Losartan Potassium-HCTZ 100-12.5 MG Oral Tab Take 1 tablet by mouth  daily. 90 tablet 0    METFORMIN HCL 1000 MG Oral Tab TAKE 1 TABLET BY MOUTH TWICE A DAY WITH MEALS 180 tablet 1    cholecalciferol 1000 UNITS Oral Cap Take 1 capsule (1,000 Units total) by mouth daily.      aspirin 325 MG Oral Tab Take 1 tablet (325 mg total) by mouth daily.      Multiple Vitamins-Minerals (PRESERVISION AREDS) Oral Tab Take 2 tablets by mouth daily.      ACCU-CHEK MULTICLIX LANCETS Does not apply Misc TEST BLOOD 2 TIMES PER  each 3     No Known Allergies   Past Medical History:   Diagnosis Date    Dyslipidemia associated with type 2 diabetes mellitus  (HCC)     Essential hypertension     Hx of adenomatous colonic polyps 2019    None in . Repeat CLN in .    Peripheral sensory neuropathy due to type 2 diabetes mellitus (HCC)     Primary osteoarthritis of knees, bilateral     Type 2 diabetes mellitus (HCC)       Past Surgical History:   Procedure Laterality Date    APPENDECTOMY        Family History   Problem Relation Age of Onset    Hypertension Mother     Diabetes Mother     Other (CVA) Mother          age 83    Diabetes Sister     Hypertension Sister       Social History:  Social History     Socioeconomic History    Marital status:    Tobacco Use    Smoking status: Never    Smokeless tobacco: Never   Vaping Use    Vaping Use: Never used   Substance and Sexual Activity    Alcohol use: No    Drug use: No           REVIEW OF SYSTEMS:   GENERAL: No fever  LUNGS: No cough wheezing or shortness of breath  CARDIAC: No lightheadedness palpitations or chest pain  GI: No anorexia heartburn dysphagia nausea vomiting abdominal pain diarrhea constipation or rectal bleeding chest  : Occasional urinary frequency.  No dysuria or hematuria  MUSCULOSKELETAL: No leg swelling.  No foot ulcerations or lesions  NEURO: Chronic numbness in both feet.  No headaches    EXAM:   GENERAL: Pleasant male appearing well in no distress  /66   Pulse 91   Ht 6' 2\" (1.88 m)   Wt 254 lb  (115.2 kg)   BMI 32.61 kg/m²   HEENT: Anicteric, conjunctiva pink, oropharynx normal  NECK: Supple without mass or thyromegaly  NODES: No peripheral adenopathy  LUNGS: Resonant to percussion and clear to auscultation  CARDIAC: Rhythm regular S1 S2 normal without murmur or edema  ABDOMEN: Bowel sounds normal soft nontender without mass or hepatosplenomegaly  EXTREMITIES: Bilateral barefoot skin diabetic exam is abnormal with diabetic monofilament/sensation testing abnormal, with normal appearing feet without ulcerations or lesions  PULSES: Carotid upstrokes 2+ without carotid bruits, distal pulses 2+ bilateral dorsalis pedis and posterior tibial  NEURO: Sensory testing with the 10 g monofilament diabetic sensory exam instrument reveals multiple areas of sensory loss in both feet    ASSESSMENT AND PLAN:   Jan Casas is a 69 year old male who presents for a complete physical exam.     1. Annual physical exam  Discussed and recommended routine immunizations but he declines  Check CMP CBC glycohemoglobin lipid profile PSA TSH with reflex T4 and urine microalbumin when able.  Order sent.  Discussed and recommended healthy diet and regular exercise with some weight loss.  Next eye exam due in April.  Discussed with patient.  Continue current medications.  Prescription for freestyle alphonso 2 glucose monitoring sent to pharmacy.  Pending labs, return visit in 6 months.  - Comp Metabolic Panel (14); Future  - CBC, Platelet; No Differential; Future  - Hemoglobin A1C; Future  - Lipid Panel; Future  - PSA Total, Screen; Future  - Microalb/Creat Ratio, Random Urine; Future  - TSH W Reflex To Free T4; Future    2. Type 2 diabetes mellitus with diabetic neuropathy, without long-term current use of insulin (HCC)  Await labs and continue current medication    3. Peripheral sensory neuropathy due to type 2 diabetes mellitus (HCC)  Stable without foot ulcerations or lesions    4. Essential hypertension  Well-controlled.  Continue  current medication    5. Dyslipidemia associated with type 2 diabetes mellitus  (HCC)  Continue statin and await labs    6. Hx of adenomatous colonic polyps  Up-to-date on colonoscopy      Crow Almeida MD  2/5/2024  12:19 PM

## 2024-02-05 NOTE — TELEPHONE ENCOUNTER
Freestyle Abhijeet - alternate requested: Change to Dexcom      Current Outpatient Medications:     Continuous Blood Gluc  (FREESTYLE ABHIJEET 2 READER) Does not apply Device, 1 each every 14 (fourteen) days. Use every 14 days, Disp: 6 each, Rfl: 3

## 2024-02-07 ENCOUNTER — LAB ENCOUNTER (OUTPATIENT)
Dept: LAB | Age: 69
End: 2024-02-07
Attending: INTERNAL MEDICINE
Payer: COMMERCIAL

## 2024-02-07 DIAGNOSIS — Z00.00 ANNUAL PHYSICAL EXAM: ICD-10-CM

## 2024-02-07 LAB
ALBUMIN SERPL-MCNC: 4.3 G/DL (ref 3.2–4.8)
ALBUMIN/GLOB SERPL: 1.2 {RATIO} (ref 1–2)
ALP LIVER SERPL-CCNC: 100 U/L
ALT SERPL-CCNC: 16 U/L
ANION GAP SERPL CALC-SCNC: 6 MMOL/L (ref 0–18)
AST SERPL-CCNC: 13 U/L (ref ?–34)
BILIRUB SERPL-MCNC: 0.7 MG/DL (ref 0.2–1.1)
BUN BLD-MCNC: 16 MG/DL (ref 9–23)
BUN/CREAT SERPL: 15.8 (ref 10–20)
CALCIUM BLD-MCNC: 9.6 MG/DL (ref 8.7–10.4)
CHLORIDE SERPL-SCNC: 102 MMOL/L (ref 98–112)
CHOLEST SERPL-MCNC: 121 MG/DL (ref ?–200)
CO2 SERPL-SCNC: 27 MMOL/L (ref 21–32)
COMPLEXED PSA SERPL-MCNC: 1.15 NG/ML (ref ?–4)
CREAT BLD-MCNC: 1.01 MG/DL
CREAT UR-SCNC: 193.1 MG/DL
DEPRECATED RDW RBC AUTO: 37.2 FL (ref 35.1–46.3)
EGFRCR SERPLBLD CKD-EPI 2021: 82 ML/MIN/1.73M2 (ref 60–?)
ERYTHROCYTE [DISTWIDTH] IN BLOOD BY AUTOMATED COUNT: 12.4 % (ref 11–15)
EST. AVERAGE GLUCOSE BLD GHB EST-MCNC: 266 MG/DL (ref 68–126)
FASTING PATIENT LIPID ANSWER: YES
FASTING STATUS PATIENT QL REPORTED: YES
GLOBULIN PLAS-MCNC: 3.5 G/DL (ref 2.8–4.4)
GLUCOSE BLD-MCNC: 220 MG/DL (ref 70–99)
HBA1C MFR BLD: 10.9 % (ref ?–5.7)
HCT VFR BLD AUTO: 38.7 %
HDLC SERPL-MCNC: 38 MG/DL (ref 40–59)
HGB BLD-MCNC: 13.4 G/DL
LDLC SERPL CALC-MCNC: 62 MG/DL (ref ?–100)
MCH RBC QN AUTO: 29.3 PG (ref 26–34)
MCHC RBC AUTO-ENTMCNC: 34.6 G/DL (ref 31–37)
MCV RBC AUTO: 84.7 FL
MICROALBUMIN UR-MCNC: 0.9 MG/DL
MICROALBUMIN/CREAT 24H UR-RTO: 4.7 UG/MG (ref ?–30)
NONHDLC SERPL-MCNC: 83 MG/DL (ref ?–130)
OSMOLALITY SERPL CALC.SUM OF ELEC: 288 MOSM/KG (ref 275–295)
PLATELET # BLD AUTO: 255 10(3)UL (ref 150–450)
POTASSIUM SERPL-SCNC: 3.8 MMOL/L (ref 3.5–5.1)
PROT SERPL-MCNC: 7.8 G/DL (ref 5.7–8.2)
RBC # BLD AUTO: 4.57 X10(6)UL
SODIUM SERPL-SCNC: 135 MMOL/L (ref 136–145)
TRIGL SERPL-MCNC: 115 MG/DL (ref 30–149)
TSI SER-ACNC: 1.13 MIU/ML (ref 0.55–4.78)
VLDLC SERPL CALC-MCNC: 17 MG/DL (ref 0–30)
WBC # BLD AUTO: 9.9 X10(3) UL (ref 4–11)

## 2024-02-07 PROCEDURE — 82043 UR ALBUMIN QUANTITATIVE: CPT

## 2024-02-07 PROCEDURE — 83036 HEMOGLOBIN GLYCOSYLATED A1C: CPT

## 2024-02-07 PROCEDURE — 84443 ASSAY THYROID STIM HORMONE: CPT

## 2024-02-07 PROCEDURE — 82570 ASSAY OF URINE CREATININE: CPT

## 2024-02-07 PROCEDURE — 85027 COMPLETE CBC AUTOMATED: CPT

## 2024-02-07 PROCEDURE — 80061 LIPID PANEL: CPT

## 2024-02-07 PROCEDURE — 80053 COMPREHEN METABOLIC PANEL: CPT

## 2024-02-07 PROCEDURE — 36415 COLL VENOUS BLD VENIPUNCTURE: CPT

## 2024-03-05 RX ORDER — LOSARTAN POTASSIUM AND HYDROCHLOROTHIAZIDE 12.5; 1 MG/1; MG/1
1 TABLET ORAL DAILY
Qty: 90 TABLET | Refills: 1 | Status: SHIPPED | OUTPATIENT
Start: 2024-03-05

## 2024-03-06 NOTE — TELEPHONE ENCOUNTER
Refill passed per [facility] protocol.   Requested Prescriptions   Pending Prescriptions Disp Refills    LOSARTAN POTASSIUM-HCTZ 100-12.5 MG Oral Tab [Pharmacy Med Name: LOSARTAN-HCTZ 100-12.5 MG TAB] 90 tablet 0     Sig: TAKE 1 TABLET BY MOUTH EVERY DAY       Hypertension Medications Protocol Passed - 3/4/2024 12:02 AM        Passed - CMP or BMP in past 12 months        Passed - Last BP reading less than 140/90     BP Readings from Last 1 Encounters:   02/05/24 136/66               Passed - In person appointment or virtual visit in the past 12 mos or appointment in next 3 mos     Recent Outpatient Visits              4 weeks ago Annual physical exam    [facility] Crow Almeida MD    Office Visit    10 months ago Primary osteoarthritis of left knee    [facility] Katalina Gee PA-C    Office Visit    12 months ago Primary osteoarthritis of left knee    [facility] [place] [facility]    Office Visit    1 year ago Primary osteoarthritis of left knee    [facility] Henry Soto MD    Office Visit    1 year ago Annual physical exam    [facility] Crow Almeida MD    Office Visit          Future Appointments         Provider Department Appt Notes    In 4 months Mireille Zuñiga MD [facility] Diabetes (policy informed)    In 5 months Crow Almeida MD [facility] 6M FU               Passed - EGFRCR or GFRAA > 50     GFR Evaluation  EGFRCR: 82 , resulted on 2/7/2024

## 2024-04-01 ENCOUNTER — TELEPHONE (OUTPATIENT)
Dept: INTERNAL MEDICINE CLINIC | Facility: CLINIC | Age: 69
End: 2024-04-01

## 2024-04-01 NOTE — TELEPHONE ENCOUNTER
Patient called and requested refills for the metformin and Dexcom sensor .   Per chart review, sensor (dexcom and free style ) has refills available.   RN called the patient, informed about  the sensor, BUT stated that his pharmacy told him that there is no refills available, [facility] .   [facility] called [facility] and spoke with Reanna, confirmed that the freestyle sensor has refills available, BUT the Dexcom sensor needs PA .   Chart reviewed ===see PA 2/5/24 ===Dexcom denied.   RN called the patient and above information provided,states that he wants the free style sensor.   RN updated the  medication record.              Metformin Rx pended to run through protocol

## 2024-04-01 NOTE — TELEPHONE ENCOUNTER
[email or web address]/login  Key: [id number]      Continuous Blood Gluc  (MugenUpYLE SINGH 2 READER) Does not apply Device, 1 each every 14 (fourteen) days. Use every 14 days, Disp: 6 each, Rfl: 3    Hope I picked the right one for the .

## 2024-04-01 NOTE — TELEPHONE ENCOUNTER
Please review; protocol failed/No Protocol    Requested Prescriptions   Pending Prescriptions Disp Refills    METFORMIN HCL 1000 MG Oral Tab [Pharmacy Med Name: METFORMIN HCL 1,000 MG TABLET] 180 tablet 1     Sig: TAKE 1 TABLET BY MOUTH TWICE A DAY WITH MEALS       Diabetes Medication Protocol Failed - 4/1/2024 11:35 AM        Failed - Last A1C < 7.5 and within past 6 months     Lab Results   Component Value Date    A1C 10.9 (H) 02/07/2024             Passed - In person appointment or virtual visit in the past 6 mos or appointment in next 3 mos     Recent Outpatient Visits              1 month ago [facility]    Office Visit    11 months [facility]    Office Visit    1 year ago Primary osteoarthritis of left knee    [facility] [place]    Office Visit    1 year ago Primary osteoarthritis of left knee    [facility]    Office Visit    1 year ago Annual physical exam    [facility] Crow Almeida MD    Office Visit          Future Appointments         Provider Department Appt Notes    In 3 months Mireille Zuñiga MD [facility] Diabetes (policy informed)    In 4 months Crow Almeida MD [facility] 6M FU-REFILLS               Passed - Microalbumin procedure in past 12 months or taking ACE/ARB        Passed - EGFRCR or GFRAA > 50     GFR Evaluation  EGFRCR: 82 , resulted on 2/7/2024          Passed - GFR in the past 12 months           Future Appointments         Provider Department Appt Notes    In 3 months Mireille Zuñiga MD [facility] Diabetes (policy informed)    In 4 months Crow Almeida MD [facility] 6M FU-REFILLS          Recent Outpatient Visits              [facility]    Office Visit    11 months ago [facility] oste[facility] of left [facility] [place] [facility]    Office Visit    1 year ago Primary osteoarthritis of left knee    [facility] [place]atalina Cowan PA-C    Office Visit    1 year ago Primary osteoarthritis of left knee    [facility]    Office Visit    1 year ago [facility] physical [facility]    Office Visit

## 2024-04-02 ENCOUNTER — TELEPHONE (OUTPATIENT)
Dept: INTERNAL MEDICINE CLINIC | Facility: CLINIC | Age: 69
End: 2024-04-02

## 2024-04-02 NOTE — TELEPHONE ENCOUNTER
Pharmacy rep was informed of below.    Note (4/1/2024): PA denied =not covered by insurance     April 1, 2024  Kelsey Lopez, CMA     DY    4/1/24  4:10 PM  Note  Duplicate patient was notified in February. See encounter 2/5/24                MP    4/1/24 12:05 PM  Raquel Harry routed this conversation to Em Triage Support  Raquel Harry     MP    4/1/24 12:05 PM  Note        Go.MobileHandshake/login  Key: [id number]       Continuous Blood Gluc  ([employer] Sports LogicYLE SINGH 2 READER) Does not apply Device, 1 each every 14 (fourteen) days. Use every 14 days, Disp: 6 each, Rfl: 3     Hope I picked the right one for the .

## 2024-07-08 ENCOUNTER — OFFICE VISIT (OUTPATIENT)
Dept: ENDOCRINOLOGY CLINIC | Facility: CLINIC | Age: 69
End: 2024-07-08

## 2024-07-08 VITALS
HEIGHT: 74.02 IN | DIASTOLIC BLOOD PRESSURE: 74 MMHG | SYSTOLIC BLOOD PRESSURE: 137 MMHG | WEIGHT: 251 LBS | BODY MASS INDEX: 32.21 KG/M2 | HEART RATE: 86 BPM

## 2024-07-08 DIAGNOSIS — E11.65 TYPE 2 DIABETES MELLITUS WITH HYPERGLYCEMIA, WITHOUT LONG-TERM CURRENT USE OF INSULIN (HCC): ICD-10-CM

## 2024-07-08 DIAGNOSIS — E78.5 DYSLIPIDEMIA: Primary | ICD-10-CM

## 2024-07-08 LAB
GLUCOSE BLOOD: 256
HEMOGLOBIN A1C: 7.1 % (ref 4.3–5.6)
TEST STRIP LOT #: NORMAL NUMERIC

## 2024-07-08 PROCEDURE — 3061F NEG MICROALBUMINURIA REV: CPT | Performed by: INTERNAL MEDICINE

## 2024-07-08 PROCEDURE — 3078F DIAST BP <80 MM HG: CPT | Performed by: INTERNAL MEDICINE

## 2024-07-08 PROCEDURE — 82947 ASSAY GLUCOSE BLOOD QUANT: CPT | Performed by: INTERNAL MEDICINE

## 2024-07-08 PROCEDURE — 99244 OFF/OP CNSLTJ NEW/EST MOD 40: CPT | Performed by: INTERNAL MEDICINE

## 2024-07-08 PROCEDURE — 3075F SYST BP GE 130 - 139MM HG: CPT | Performed by: INTERNAL MEDICINE

## 2024-07-08 PROCEDURE — 95251 CONT GLUC MNTR ANALYSIS I&R: CPT | Performed by: INTERNAL MEDICINE

## 2024-07-08 PROCEDURE — 3051F HG A1C>EQUAL 7.0%<8.0%: CPT | Performed by: INTERNAL MEDICINE

## 2024-07-08 PROCEDURE — 83036 HEMOGLOBIN GLYCOSYLATED A1C: CPT | Performed by: INTERNAL MEDICINE

## 2024-07-08 PROCEDURE — 3008F BODY MASS INDEX DOCD: CPT | Performed by: INTERNAL MEDICINE

## 2024-07-08 PROCEDURE — 3046F HEMOGLOBIN A1C LEVEL >9.0%: CPT | Performed by: INTERNAL MEDICINE

## 2024-07-08 RX ORDER — EMPAGLIFLOZIN 25 MG/1
25 TABLET, FILM COATED ORAL DAILY
Qty: 90 TABLET | Refills: 0 | Status: SHIPPED | OUTPATIENT
Start: 2024-07-08

## 2024-07-08 NOTE — H&P
New Patient Visit - Diabetes    CONSULT - Reason for Visit:  Diabetes management.    Requesting Physician: Dr. Almeida     CHIEF COMPLAINT:    DM     HISTORY OF PRESENT ILLNESS:   Jan Casas is a 69 year old male who is here to establish care for DM.     DM HISTORY  Diagnosed:       HISTORY OF DIABETES COMPLICATIONS: :  History of Retinopathy: Jan 2024  - last eye exam: 2024 [facility]  History of Neuropathy: yes   History of Nephropathy: no     ASSOCIATED COMPLICATIONS:   HTN: yes  Hyperlipidemia: yes  Coronary Artery Disease:  denies  Cerebrovascular Disease: denies      HOME GLUCOSE READINGS:   Abhijeet download as below      CURRENT DIABETIC MEDICATIONS INCLUDE:  Jardiance 10 mg daily   MTF 1000 mg BF and dinner    MEALS:  Moderate compliance  Two main meals a day   He works from 10 pm to 6 am and on some days 6 pm to 6 am     EXERCISE:  Very active at work      CURRENT MEDICATIONS:    Current Outpatient Medications   Medication Sig Dispense Refill    Empagliflozin (JARDIANCE) 25 MG Oral Tab Take 25 mg by mouth daily. 90 tablet 0    metFORMIN HCl 1000 MG Oral Tab Take 1 tablet (1,000 mg total) by mouth 2 (two) times daily with meals. 180 tablet 1    Losartan Potassium-HCTZ 100-12.5 MG Oral Tab Take 1 tablet by mouth daily. 90 tablet 1    Continuous Blood Gluc  (FREESTYLE ABHIJEET 2 READER) Does not apply Device 1 each every 14 (fourteen) days. Use every 14 days 6 each 3    Continuous Blood Gluc Sensor (FREESTYLE ABHIJEET 2 SENSOR) Does not apply Misc 1 each every 14 (fourteen) days. Use every 14 days 6 each 3    atorvastatin 20 MG Oral Tab Take 1 tablet (20 mg total) by mouth nightly. 90 tablet 3    cholecalciferol 1000 UNITS Oral Cap Take 1 capsule (1,000 Units total) by mouth daily.      aspirin 325 MG Oral Tab Take 1 tablet (325 mg total) by mouth daily.      Multiple Vitamins-Minerals (PRESERVISION AREDS) Oral Tab Take 2 tablets by mouth daily.      ACCU-CHEK MULTICLIX LANCETS Does not apply Misc  TEST BLOOD 2 TIMES PER  each 3       PAST MEDICAL HISTORY:   Past Medical History:    Diabetes (HCC)    Dyslipidemia associated with type 2 diabetes mellitus (HCC)    Essential hypertension    Hx of adenomatous colonic polyps    None in . Repeat CLN in .    Peripheral sensory neuropathy due to type 2 diabetes mellitus (HCC)    Primary osteoarthritis of knees, bilateral    Type 2 diabetes mellitus (HCC)       PAST SURGICAL HISTORY:   Past Surgical History:   Procedure Laterality Date    Appendectomy         ALLERGIES:  No Known Allergies    SOCIAL HISTORY:    Social History     Socioeconomic History    Marital status:    Tobacco Use    Smoking status: Never    Smokeless tobacco: Never   Vaping Use    Vaping status: Never Used   Substance and Sexual Activity    Alcohol use: No    Drug use: No       FAMILY HISTORY:   Family History   Problem Relation Age of Onset    Hypertension Mother     Diabetes Mother     Other (CVA) Mother          age 83    Diabetes Sister     Hypertension Sister        ASSESSMENTS:        REVIEW OF SYSTEMS:  Constitutional: Negative for: weight change, fever, fatigue, cold/heat intolerance  Eyes: Negative for:  Visual changes, proptosis, blurring, floaters, poor night vision, impaired color vision  ENT: Negative for:  dysphagia, neck swelling, dysphonia  Respiratory: Negative for:  dyspnea, cough  Cardiovascular: Negative for:  chest pain, palpitations, orthopnea  GI: Negative for:  abdominal pain, nausea, vomiting, diarrhea, constipation, bleeding  Neurology: Negative for: headache, numbness, weakness,   Genito-Urinary: Negative for: dysuria, frequency  Psychiatric: Negative for:  depression, anxiety  Hematology/Lymphatics: Negative for: bruising, lower extremity edema  Endocrine: Negative for: polydypsia, + polyuria  Skin: Negative for: rash, blister,      PHYSICAL EXAM:   Vitals:    24 1113   BP: 137/74   Pulse: 86   Weight: 251 lb (113.9 kg)   Height: 6'  2.02\" (1.88 m)     BMI: Body mass index is 32.21 kg/m².         General Appearance:  alert, well developed, in no acute distress  Head: Atraumatic  Eyes:  normal conjunctivae, sclera., normal sclera and normal pupils  Throat/Neck: normal sound to voice. Normal hearing, normal speech  Respiratory:  Speaking in full sentences, non-labored. no increased work of breathing, no audible wheezing    Neuro: motor grossly intact, moving all extremities without difficulty  Psychiatric:  oriented to time, self, and place  Extremities: July 2024  Bilateral barefoot skin diabetic exam is normal, visualized feet and the appearance is normal.  Bilateral monofilament/sensation of both feet is decrease  Pulsation pedal pulse exam of both lower legs/feet is normal as well.            DATA:     Pertinent data reviewed    A1c is 7.1 %   ASSESSMENT AND PLAN:    1.  Type 2 DM:      Plan:  Discussed the pathogenesis, natural course of diabetes. Patient understands the importance of glycemic control and the implications of uncontrolled diabetes including Diabetic ketoacidosis and various micro vascular and macrovascular complications.        a). Medications:    Continuous Glucose Monitoring Interpretation    Jan Casas has undergone continuous glucose monitoring with the personal alphonso  He was evaluated with the alphonso from June 25 to July 8, 2024  His blood glucose tracings demonstrated :   Very high 20 %   High 49 %   Target 31 %   Low 0 %   Very low 0 %      As a result of his testing the following plan was made:     Metformin 1000 mg BF and dinner  Take with food  GI SE reviewed      Jardiance 10--> 25  mg daily   Discussed side effects including UTI and fungal infections, genital infections.   Discussed the importance of hydration.    Patient reports increased urination with Jardiance.  No other urinary symptoms.  No itching and no skin infection.  Discussed the mechanism of action of Jardiance.  We will increase the dose for now,  since he has responded well to the medication.  If increase in urination persists, will switch to Ozempic in that case.  I have reviewed Ozempic in detail including mechanism of action and side effects. Pen teaching also provided    Check and call with sugars as discussed     b). No Nephropathy  c). Discussed importance of annual eye exams  d). Foot exam: Daily feet exam explained  e). BG log maintainence explained in great detail, to get log and glucometer on next visit.  f). Life style changes reviewed  g). Hypoglycemia recognition and management discussed    2. Patient’s BP is  normal today  3. Dyslipidemia  A) Discussed lifestyle modifications including reductions in dietary total and saturated fat, weight loss, aerobic exercise, and eating a diet rich in fruits and vegetables.  B) Statin therapy  Discussed the potential side effects of statins including muscle and liver injury.    Check and call with sugars as discussed      Orders Placed This Encounter   Procedures    POC HemoCue Glucose 201 (Finger stick glucose)    POC Glycohemoglobin [56786]         7/8/2024  Mireille Zuñiga MD

## 2024-07-10 NOTE — TELEPHONE ENCOUNTER
Please Review. Protocol Failed; No Protocol   Lab Results   Component Value Date     A1C 7.1 (A) 07/08/2024     Requested Prescriptions   Pending Prescriptions Disp Refills    METFORMIN HCL 1000 MG Oral Tab [Pharmacy Med Name: METFORMIN HCL 1,000 MG TABLET] 180 tablet 1     Sig: TAKE 1 TABLET BY MOUTH TWICE A DAY WITH MEALS       Diabetes Medication Protocol Failed - 7/6/2024  5:08 PM        Failed - Last A1C < 7.5 and within past 6 months     Lab Results   Component Value Date    A1C 7.1 (A) 07/08/2024             Passed - In person appointment or virtual visit in the past 6 mos or appointment in next 3 mos     Recent Outpatient Visits              2 days ago Dyslipidemia    [facility] Mireille Zuñiga MD    Office Visit    5 months ago Annual physical exam    [facility] Crow Almeida MD    Office Visit    1 year ago [facility]    Office Visit    1 year ago Primary osteoarthritis of left knee    [facility], Katalina Cowan PA-C    Office Visit    1 year ago Primary osteoarthritis of left knee    [facility] Henry Soto MD    Office Visit          Future Appointments         Provider Department Appt Notes    In 3 weeks Crow Almeida MD [facility] 6M FU-REFILLS    In 2 months M[facility]ille Zu[facility]iga MD [facility] 3 Month Follow Up                    Passed - Microalbumin procedure in past 12 months or taking ACE/ARB        Passed - EGFRCR or GFRAA > 50     GFR Evaluation  EGFRCR: 82 , resulted on 2/7/2024          Passed - GFR in the past 12 months               Future Appointments         Provider Department Appt Notes    In 3 weeks  Crow Almeida MD [facility], [facility] 6M FU-REFILLS    In 2 months Mireille Zuñiga MD [facility] 3 Month Follow Up          Recent Outpatient Visits              2 days ago Dyslipidemia    [facility], [facility] Mireille Zuñiga MD    Office Visit    5 months ago Annual physical exam    [facility] Crow Almeida MD    Office Visit    1 year ago Primary osteoarthritis of left knee    [facility] [place] [facility]atalina Caro PA-C    Office Visit    1 year ago Primary osteoarthritis of left knee    [facility], Katalina Cowan PA-C    Office Visit    1 year ago Primary osteoarthritis of left knee    [facility] [place] Henry Soto MD    Office Visit

## 2024-07-12 ENCOUNTER — TELEPHONE (OUTPATIENT)
Dept: INTERNAL MEDICINE CLINIC | Facility: CLINIC | Age: 69
End: 2024-07-12

## 2024-07-12 NOTE — TELEPHONE ENCOUNTER
Patient states he was told Metformin prescription was filled yesterday and that he has been trying to get the prescription since Monday.  Noted in medication record an attempt was made to send the metformin prescription and that \"transmission failed\"   Prescription was resent and \"receipt confirmed by pharmacy\"  Advised patient this RN would also call [facility] to confirm they received the prescription.  Spoke with pharmacy tech who confirms prescription was sent.

## 2024-07-31 NOTE — PATIENT INSTRUCTIONS
FOOD   Fruits: is high in sugars. Following are better: berries, apples, pears, papaya --> but limit quantity  Vegetables are good except Corn, sweet potato and potato and beans-> limit consumption of these  Protein: you can have chicken fish, turkey( white thin lean meats are good for  you)  Grains like bread, pasta, rice, quinoa, cereal, oatmeal are all high in sugar content . So you want to less of these. Also choose the whole wheat brown bread/ pasta since they are better for you in regards to diabetes  Dairy:  in moderation    Walk as tolerated, 5-10 minutes after each meal      Well appearing, awake, alert, oriented to person, place, time/situation and in no apparent distress. normal...

## 2024-08-05 ENCOUNTER — OFFICE VISIT (OUTPATIENT)
Dept: INTERNAL MEDICINE CLINIC | Facility: CLINIC | Age: 69
End: 2024-08-05

## 2024-08-05 VITALS
SYSTOLIC BLOOD PRESSURE: 124 MMHG | HEART RATE: 88 BPM | DIASTOLIC BLOOD PRESSURE: 64 MMHG | BODY MASS INDEX: 32.81 KG/M2 | WEIGHT: 255.63 LBS | HEIGHT: 74 IN

## 2024-08-05 DIAGNOSIS — E11.40 TYPE 2 DIABETES MELLITUS WITH DIABETIC NEUROPATHY, WITHOUT LONG-TERM CURRENT USE OF INSULIN (HCC): ICD-10-CM

## 2024-08-05 DIAGNOSIS — I10 ESSENTIAL HYPERTENSION: Primary | ICD-10-CM

## 2024-08-05 PROCEDURE — 3074F SYST BP LT 130 MM HG: CPT | Performed by: INTERNAL MEDICINE

## 2024-08-05 PROCEDURE — 3078F DIAST BP <80 MM HG: CPT | Performed by: INTERNAL MEDICINE

## 2024-08-05 PROCEDURE — G2211 COMPLEX E/M VISIT ADD ON: HCPCS | Performed by: INTERNAL MEDICINE

## 2024-08-05 PROCEDURE — 99214 OFFICE O/P EST MOD 30 MIN: CPT | Performed by: INTERNAL MEDICINE

## 2024-08-05 PROCEDURE — 3008F BODY MASS INDEX DOCD: CPT | Performed by: INTERNAL MEDICINE

## 2024-08-05 NOTE — PATIENT INSTRUCTIONS
Your blood pressure today was excellent at 124/64  Please continue your current medications, healthy diet and regular exercise  Please schedule a physical in 6 months

## 2024-08-05 NOTE — PROGRESS NOTES
Jan Casas is a 69 year old male.   Chief Complaint   Patient presents with    Diabetes    Hypertension     HPI:   Mr. Casas presents this afternoon for 6-month follow-up of type 2 diabetes and hypertension.    He is now seeing Endocrinology, and is on Jardiance since his physical last February.  Fasting Accu-Cheks performed most every morning 130-140.  He tries to watch his diet and is trying to exercise regularly.  Weight has been stable.  No out of office BP monitoring.  No headaches.  No lightheadedness or dizziness.  No palpitations or chest pain.  No shortness of breath.    Medications reviewed, as listed below.  Appointment with Endocrinology in October.  Glycohemoglobin 7.1% last month.  He says he had an eye exam shortly after his physical in February which was normal, and he will ask that a report be forwarded to our office.  Current Outpatient Medications   Medication Sig Dispense Refill    metFORMIN HCl 1000 MG Oral Tab Take 1 tablet (1,000 mg total) by mouth 2 (two) times daily with meals. 180 tablet 1    Empagliflozin (JARDIANCE) 25 MG Oral Tab Take 25 mg by mouth daily. 90 tablet 0    Losartan Potassium-HCTZ 100-12.5 MG Oral Tab Take 1 tablet by mouth daily. 90 tablet 1    Continuous Blood Gluc  (FREESTYLE SINGH 2 READER) Does not apply Device 1 each every 14 (fourteen) days. Use every 14 days 6 each 3    Continuous Blood Gluc Sensor (FREESTYLE SINGH 2 SENSOR) Does not apply Misc 1 each every 14 (fourteen) days. Use every 14 days 6 each 3    atorvastatin 20 MG Oral Tab Take 1 tablet (20 mg total) by mouth nightly. 90 tablet 3    cholecalciferol 1000 UNITS Oral Cap Take 1 capsule (1,000 Units total) by mouth daily.      aspirin 325 MG Oral Tab Take 1 tablet (325 mg total) by mouth daily.      Multiple Vitamins-Minerals (PRESERVISION AREDS) Oral Tab Take 2 tablets by mouth daily.      ACCU-CHEK MULTICLIX LANCETS Does not apply Misc TEST BLOOD 2 TIMES PER  each 3     No Known  Allergies   Past Medical History:    Dyslipidemia associated with type 2 diabetes mellitus (HCC)    Essential hypertension    Hx of adenomatous colonic polyps    None in 2022. Repeat CLN in 2027.    Peripheral sensory neuropathy due to type 2 diabetes mellitus (HCC)    Primary osteoarthritis of knees, bilateral    Type 2 diabetes mellitus (HCC)     Past Surgical History:   Procedure Laterality Date    Appendectomy  1980      Social History:  Social History     Socioeconomic History    Marital status:    Tobacco Use    Smoking status: Never    Smokeless tobacco: Never   Vaping Use    Vaping status: Never Used   Substance and Sexual Activity    Alcohol use: No    Drug use: No        EXAM:   GENERAL: Pleasant male appearing well in no distress  /64   Pulse 88   Ht 6' 2\" (1.88 m)   Wt 255 lb 9.6 oz (115.9 kg)   BMI 32.82 kg/m²   LUNGS: Resonant to percussion and clear to auscultation  CARDIAC: Rhythm regular S1 S2 normal without murmur   ABDOMEN: Bowel sounds normal soft nontender       ASSESSMENT AND PLAN:   1. Essential hypertension  Well-controlled  Continue current medication  Reinforced healthy diet and regular exercise  Physical in 6 months    2. Type 2 diabetes mellitus with diabetic neuropathy, without long-term current use of insulin (HCC)  Recent glycohemoglobin well-controlled at 7.1%  Appointment with Endocrinology in October      The patient indicates understanding of these issues and agrees to the plan.  The patient is asked to return in 6 months.    Crow Almeida MD  8/5/2024  12:09 PM

## 2024-08-14 NOTE — TELEPHONE ENCOUNTER
Refill Per Protocol     Requested Prescriptions   Pending Prescriptions Disp Refills    JARDIANCE 10 MG Oral Tab [Pharmacy Med Name: JARDIANCE 10 MG TABLET] 90 tablet 0     Sig: TAKE 1 TABLET BY MOUTH EVERY DAY       Diabetes Medication Protocol Passed - 8/11/2024 12:42 AM        Passed - Last A1C < 7.5 and within past 6 months     Lab Results   Component Value Date    A1C 7.1 (A) 07/08/2024             Passed - In person appointment or virtual visit in the past 6 mos or appointment in next 3 mos     Recent Outpatient Visits              1 week ago Essential hypertension    [facility]    Office Visit    1 month ago Dyslipidemia    [facility] M[facility]ille [facility]    Office Visit    6 months ago Annual physical exam    [facility], Crow Perry MD    Office Visit    1 year ago Primary osteoarthritis of left knee    [facility], Katalina Cowan PA-C    Office Visit    1 year ago Primary osteoarthritis of left knee    [facility], Katalina Cowan PA-C    Office Visit          Future Appointments         Provider Department Appt Notes    In 1 month Mireille Zuñiga MD [facility] 3 Month Follow Up                    Passed - Microalbumin procedure in past 12 months or taking ACE/ARB        Passed - EGFRCR or GFRAA > 50     GFR Evaluation  EGFRCR: 82 , resulted on 2/7/2024          Passed - GFR in the past 12 months               Future Appointments         Provider Department Appt Notes    In 1 month [facility]          Recent Outpatient Visits              1 week ago Essential hypertension    [facility],  Crow Perry MD    Office Visit    1 month ago Dyslipidemia    [facility], Mireille Hughes MD    Office Visit    6 months ago Annual physical exam    [facility], Crow Perry MD    Office Visit    1 year ago Primary osteoarthritis of left knee    [facility], Katalina Cowan PA-C    Office Visit    1 year ago Primary osteoarthritis of left knee    [facility], Katalina Cowan PA-C    Office Visit

## 2024-09-13 RX ORDER — LOSARTAN POTASSIUM AND HYDROCHLOROTHIAZIDE 12.5; 1 MG/1; MG/1
1 TABLET ORAL DAILY
Qty: 90 TABLET | Refills: 3 | Status: SHIPPED | OUTPATIENT
Start: 2024-09-13

## 2024-09-13 NOTE — TELEPHONE ENCOUNTER
Refill passed per [facility] protocol.    Requested Prescriptions   Pending Prescriptions Disp Refills    Losartan Potassium-HCTZ 100-12.5 MG Oral Tab [Pharmacy Med Name: LOSARTAN-HCTZ 100-12.5 MG TAB] 90 tablet 3     Sig: TAKE 1 TABLET BY MOUTH EVERY DAY       Hypertension Medications Protocol Passed - 9/13/2024 10:43 AM        Passed - CMP or BMP in past 12 months        Passed - Last BP reading less than 140/90     BP Readings from Last 1 Encounters:   08/05/24 124/64               Passed - In person appointment or virtual visit in the past 12 mos or appointment in next 3 mos     Recent Outpatient Visits              1 month ago [facility]ential [facility]    Office Visit    2 months ago Dyslipidemia    [facility] Mireille Zuñiga MD    Office Visit    7 months ago Annual physical exam    [facility] Crow Perry MD    Office Visit    1 year ago [facility] osteoarthritis of left knee    [facility]    Office Visit    1 year ago Primary osteoarthritis of left knee    [facility], Katalina Cowan PA-C    Office Visit          Future Appointments         Provider Department Appt Notes    In 3 weeks Mireille Zuñiga MD [facility] 3 Month Follow Up                    Passed - EGFRCR or GFRAA > 50     GFR Evaluation  EGFRCR: 82 , resulted on 2/7/2024               Future Appointments         Provider Department Appt Notes    In 3 weeks Mireille Zuñiga MD [facility] 3 Month Follow Up            Recent Outpatient Visits              1 month ago Essential hypertension    [facility], Crow Perry MD     Office Visit    2 months ago Dyslipidemia    [facility], [facility], Mireille Hughes MD    Office Visit    7 months ago Annual physical exam    [facility], [facility], Crow Perry MD    Office Visit    1 year ago Primary osteoarthritis of left knee    [facility], [facility], Katalina Cowan PA-C    Office Visit    1 year ago Primary osteoarthritis of left knee    [facility], Katalina Cowan PA-C    Office Visit

## 2024-10-07 ENCOUNTER — OFFICE VISIT (OUTPATIENT)
Dept: ENDOCRINOLOGY CLINIC | Facility: CLINIC | Age: 69
End: 2024-10-07

## 2024-10-07 VITALS
BODY MASS INDEX: 32.98 KG/M2 | HEART RATE: 79 BPM | WEIGHT: 257 LBS | SYSTOLIC BLOOD PRESSURE: 139 MMHG | HEIGHT: 74 IN | DIASTOLIC BLOOD PRESSURE: 77 MMHG

## 2024-10-07 DIAGNOSIS — E11.65 TYPE 2 DIABETES MELLITUS WITH HYPERGLYCEMIA, WITHOUT LONG-TERM CURRENT USE OF INSULIN (HCC): ICD-10-CM

## 2024-10-07 DIAGNOSIS — E78.5 DYSLIPIDEMIA: Primary | ICD-10-CM

## 2024-10-07 LAB
GLUCOSE BLOOD: 214
HEMOGLOBIN A1C: 7.4 % (ref 4.3–5.6)
TEST STRIP LOT #: NORMAL NUMERIC

## 2024-10-07 NOTE — PROGRESS NOTES
FU VISIT     CHIEF COMPLAINT:    DM     HISTORY OF PRESENT ILLNESS:   Jan Casas is a 69 year old male who is here to FU  for DM.     DM HISTORY  Diagnosed:       HISTORY OF DIABETES COMPLICATIONS: :  History of Retinopathy: Jan 2024  - last eye exam: 2024 [facility]  History of Neuropathy: yes   History of Nephropathy: no     ASSOCIATED COMPLICATIONS:   HTN: yes  Hyperlipidemia: yes  Coronary Artery Disease:  denies  Cerebrovascular Disease: denies      HOME GLUCOSE READINGS:   CGM download as below      CURRENT DIABETIC MEDICATIONS INCLUDE:  Jardiance 25 mg daily   MTF 1000 mg BF and dinner    MEALS:  Moderate compliance  Two main meals a day   He works from 10 pm to 6 am and on some days 6 pm to 6 am     EXERCISE:  Very active at work      CURRENT MEDICATIONS:    Current Outpatient Medications   Medication Sig Dispense Refill    metFORMIN HCl 1000 MG Oral Tab Take 1 tablet (1,000 mg total) by mouth 2 (two) times daily with meals. 180 tablet 1    Empagliflozin (JARDIANCE) 25 MG Oral Tab Take 25 mg by mouth daily. 90 tablet 0    Losartan Potassium-HCTZ 100-12.5 MG Oral Tab Take 1 tablet by mouth daily. 90 tablet 3    empagliflozin (JARDIANCE) 10 MG Oral Tab Take 1 tablet (10 mg total) by mouth daily. (Patient not taking: Reported on 10/7/2024) 90 tablet 3    Continuous Blood Gluc  (FREESTYLE SINGH 2 READER) Does not apply Device 1 each every 14 (fourteen) days. Use every 14 days 6 each 3    Continuous Blood Gluc Sensor (FREESTYLE SINGH 2 SENSOR) Does not apply Misc 1 each every 14 (fourteen) days. Use every 14 days 6 each 3    atorvastatin 20 MG Oral Tab Take 1 tablet (20 mg total) by mouth nightly. 90 tablet 3    cholecalciferol 1000 UNITS Oral Cap Take 1 capsule (1,000 Units total) by mouth daily.      aspirin 325 MG Oral Tab Take 1 tablet (325 mg total) by mouth daily.      Multiple Vitamins-Minerals (PRESERVISION AREDS) Oral Tab Take 2 tablets by mouth daily.      ACCU-CHEK MULTICLIX  LANCETS Does not apply Misc TEST BLOOD 2 TIMES PER  each 3       PAST MEDICAL HISTORY:   Past Medical History:    Dyslipidemia associated with type 2 diabetes mellitus (HCC)    Essential hypertension    Hx of adenomatous colonic polyps    None in . Repeat CLN in .    Peripheral sensory neuropathy due to type 2 diabetes mellitus (HCC)    Primary osteoarthritis of knees, bilateral    Type 2 diabetes mellitus (HCC)       PAST SURGICAL HISTORY:   Past Surgical History:   Procedure Laterality Date    Appendectomy         ALLERGIES:  No Known Allergies    SOCIAL HISTORY:    Social History     Socioeconomic History    Marital status:    Tobacco Use    Smoking status: Never    Smokeless tobacco: Never   Vaping Use    Vaping status: Never Used   Substance and Sexual Activity    Alcohol use: No    Drug use: No       FAMILY HISTORY:   Family History   Problem Relation Age of Onset    Hypertension Mother     Diabetes Mother     Other (CVA) Mother          age 83    Diabetes Sister     Hypertension Sister        ASSESSMENTS:        REVIEW OF SYSTEMS:  Constitutional: Negative for: weight change, fever, fatigue, cold/heat intolerance  Eyes: Negative for:  Visual changes, proptosis, blurring, floaters, poor night vision, impaired color vision  ENT: Negative for:  dysphagia, neck swelling, dysphonia  Respiratory: Negative for:  dyspnea, cough  Cardiovascular: Negative for:  chest pain, palpitations, orthopnea  GI: Negative for:  abdominal pain, nausea, vomiting, diarrhea, constipation, bleeding  Neurology: Negative for: headache, numbness, weakness,   Genito-Urinary: Negative for: dysuria, frequency  Psychiatric: Negative for:  depression, anxiety  Hematology/Lymphatics: Negative for: bruising, lower extremity edema  Endocrine: Negative for: polydypsia, + polyuria  Skin: Negative for: rash, blister,      PHYSICAL EXAM:   Vitals:    10/07/24 1139   Weight: 257 lb (116.6 kg)   Height: 6' 2\" (1.88 m)      BMI: Body mass index is 33 kg/m².         General Appearance:  alert, well developed, in no acute distress  Head: Atraumatic  Eyes:  normal conjunctivae, sclera., normal sclera and normal pupils  Throat/Neck: normal sound to voice. Normal hearing, normal speech  Respiratory:  Speaking in full sentences, non-labored. no increased work of breathing, no audible wheezing    Neuro: motor grossly intact, moving all extremities without difficulty  Psychiatric:  oriented to time, self, and place  Extremities: July 2024  Bilateral barefoot skin diabetic exam is normal, visualized feet and the appearance is normal.  Bilateral monofilament/sensation of both feet is decrease  Pulsation pedal pulse exam of both lower legs/feet is normal as well.            DATA:     Pertinent data reviewed    A1c is 7.4 %     ASSESSMENT AND PLAN:    1.  Type 2 DM:  with hyperglycemia     Plan:  Discussed the pathogenesis, natural course of diabetes. Patient understands the importance of glycemic control and the implications of uncontrolled diabetes including Diabetic ketoacidosis and various micro vascular and macrovascular complications.        a). Medications:    Continuous Glucose Monitoring Interpretation    Jan Casas has undergone continuous glucose monitoring with the personal alphonso  He was evaluated with the alphonso from  09/14/2024 - 09/27/2024 (14 days)  Generated: 10/07/2024  % Time CGM Active: 57%        Glucose Statistics and Targets  Average Glucose: 187 mg/dL  Glucose Management Indicator (GMI): 7.8%  Glucose Variability (%CV): 23.7%  Target Range: 70 - 180 mg/dL        Time in Ranges  Very High: >250 mg/dL --- 8%  High: 181 - 250 mg/dL --- 39%  Target Range: 70 - 180 mg/dL --- 53%  Low: 54 - 69 mg/dL --- 0%  Very Low: <54 mg/dL --- 0%          As a result of his testing the following plan was made:     Metformin 1000 mg BF and dinner  Take with food  GI SE reviewed      Jardiance  25--> 10   mg daily . He has the  medication at home  Discussed side effects including UTI and fungal infections, genital infections.   Discussed the importance of hydration.      START ozempic 0.25 mg weekly x two weeks , then increase to 0.5 mg weekly   Pen teaching provided  No personal or family history of MEN syndrome  Patient counselled regarding side effects including injection site reactions, nausea, vomiting, diarrhea, pancreatitis, gastroparesis and rare side effect helen Jacob syndrome.    Check and call with sugars as discussed   Will eventually stop the jardiance if he responds well to the ozempic    b). No Nephropathy  c). Discussed importance of annual eye exams  d). Foot exam: Daily feet exam explained  e). BG log maintainence explained in great detail, to get log and glucometer on next visit.  f). Life style changes reviewed  g). Hypoglycemia recognition and management discussed    2. Patient’s BP is  normal today  3. Dyslipidemia  A) Discussed lifestyle modifications including reductions in dietary total and saturated fat, weight loss, aerobic exercise, and eating a diet rich in fruits and vegetables.  B) Statin therapy  Discussed the potential side effects of statins including muscle and liver injury.    Check and call with sugars as discussed      No orders of the defined types were placed in this encounter.        Mireille Zuñiga MD

## 2024-10-07 NOTE — PROGRESS NOTES
Name: Jan Casas  YOB: 1955  Report Period: 09/14/2024 - 09/27/2024 (14 days)  Generated: 10/07/2024  % Time CGM Active: 57%      Glucose Statistics and Targets  Average Glucose: 187 mg/dL  Glucose Management Indicator (GMI): 7.8%  Glucose Variability (%CV): 23.7%  Target Range: 70 - 180 mg/dL      Time in Ranges  Very High: >250 mg/dL --- 8%  High: 181 - 250 mg/dL --- 39%  Target Range: 70 - 180 mg/dL --- 53%  Low: 54 - 69 mg/dL --- 0%  Very Low: <54 mg/dL --- 0%

## 2024-10-22 RX ORDER — EMPAGLIFLOZIN 25 MG/1
25 TABLET, FILM COATED ORAL DAILY
Qty: 90 TABLET | Refills: 0 | OUTPATIENT
Start: 2024-10-22

## 2024-12-09 NOTE — TELEPHONE ENCOUNTER
Refill passed per [facility] protocol.  Requested Prescriptions   Pending Prescriptions Disp Refills    Continuous Glucose Sensor (FREESTYLE SINGH 2 SENSOR) Does not apply Misc 6 each 3     Si each every 14 (fourteen) days.       Diabetic Supplies Protocol Passed - 2024 10:56 AM        Passed - In person appointment or virtual visit in the past 12 mos or appointment in next 3 mos     Recent Outpatient Visits              2 months ago [facility]    Office Visit    4 months ago Essential hypertension    [facility]    Office Visit    5 months ago Dyslipidemia    [facility], Mireille Hughes MD    Office Visit    10 months ago Annual physical exam    [facility] Crow Perry MD    Office Visit    1 year ago Primary osteoarthritis of left knee    [facility] Katalina Gee PA-C    Office Visit          Future Appointments         Provider Department Appt Notes    In 4 weeks Mireille Zuñiga MD [facility] 3 months                       Future Appointments         Provider Department Appt Notes    In 4 weeks Mireille Zuñiga MD [facility] 3 months          Recent Outpatient Visits              2 months ago [facility]ys[facility]    Office Visit    4 months ago Essential hypertension    [facility] Crow Perry MD    Office Visit    5 months ago [facility]yslipidemia    [facility]    Office Visit    10 months ago Annual physical exam    [facility]  [facility], Crow Perry MD    Office Visit    1 year ago Primary osteoarthritis of left knee    [facility] [place] [facility]    Office Visit

## 2024-12-10 ENCOUNTER — TELEPHONE (OUTPATIENT)
Dept: INTERNAL MEDICINE CLINIC | Facility: CLINIC | Age: 69
End: 2024-12-10

## 2024-12-10 DIAGNOSIS — E11.40 TYPE 2 DIABETES MELLITUS WITH DIABETIC NEUROPATHY, WITHOUT LONG-TERM CURRENT USE OF INSULIN (HCC): Primary | ICD-10-CM

## 2024-12-10 NOTE — TELEPHONE ENCOUNTER
Alternative requested insurance only covers DEXCOM      Current Outpatient Medications:       Continuous Glucose Sensor (FREESTYLE SINGH 2 SENSOR) Does not apply Misc, 1 each every 14 (fourteen) days., Disp: 6 each, Rfl: 3

## 2024-12-11 ENCOUNTER — TELEPHONE (OUTPATIENT)
Dept: INTERNAL MEDICINE CLINIC | Facility: CLINIC | Age: 69
End: 2024-12-11

## 2024-12-11 RX ORDER — ACYCLOVIR 400 MG/1
1 TABLET ORAL AS DIRECTED
Qty: 9 EACH | Refills: 3 | Status: SHIPPED | OUTPATIENT
Start: 2024-12-11

## 2024-12-11 RX ORDER — ACYCLOVIR 400 MG/1
1 TABLET ORAL AS DIRECTED
Qty: 1 EACH | Refills: 0 | Status: SHIPPED | OUTPATIENT
Start: 2024-12-11

## 2024-12-11 RX ORDER — PROCHLORPERAZINE 25 MG/1
1 SUPPOSITORY RECTAL AS DIRECTED
Qty: 9 EACH | Refills: 3 | Status: CANCELLED | OUTPATIENT
Start: 2024-12-11

## 2024-12-11 RX ORDER — PROCHLORPERAZINE 25 MG/1
1 SUPPOSITORY RECTAL AS DIRECTED
Qty: 1 EACH | Refills: 3 | Status: CANCELLED | OUTPATIENT
Start: 2024-12-11

## 2024-12-11 NOTE — TELEPHONE ENCOUNTER
Dr. Almeida,    Please advise, patient's insurance only covers DEXCOM continuous glucose supplies.    New prescriptions pended for Dexcom G7 series (sensors and ). Please review and approve, if agreeable.    Requested Prescriptions     Pending Prescriptions Disp Refills    Continuous Glucose  (DEXCOM G7 ) Does not apply Device 1 each 0     Si each As Directed.    Continuous Glucose Sensor (DEXCOM G7 SENSOR) Does not apply Misc 9 each 3     Si each As Directed. INSERT 1 SENSOR AT THE BACK OF THE ARM AND CHANGE EVERY 10 DAYS.

## 2024-12-11 NOTE — TELEPHONE ENCOUNTER
[id number]    Current Outpatient Medications:     Continuous Glucose  (DEXCOM G7 ) Does not apply Device, 1 each As Directed., Disp: 1 each, Rfl: 0    Continuous Glucose Sensor (DEXCOM G7 SENSOR) Does not apply Misc, 1 each As Directed. INSERT 1 SENSOR AT THE BACK OF THE ARM AND CHANGE EVERY 10 DAYS., Disp: 9 each, Rfl: 3    Continuous Glucose Sensor (FREESTYLE SINGH 2 SENSOR) Does not apply Misc, 1 each every 14 (fourteen) days., Disp: 6 each, Rfl: 3

## 2024-12-13 ENCOUNTER — TELEPHONE (OUTPATIENT)
Dept: ENDOCRINOLOGY CLINIC | Facility: CLINIC | Age: 69
End: 2024-12-13

## 2024-12-13 NOTE — TELEPHONE ENCOUNTER
Patient calling to follow up on refill for Dexcom Sensor and . Endo staff, please see messages below

## 2024-12-13 NOTE — TELEPHONE ENCOUNTER
Patient calling states needs refill request on dexcom sensors, states has been out for a week. Please call.

## 2024-12-13 NOTE — TELEPHONE ENCOUNTER
Called pt and provided the below reasons for denial. Per pt, he has been getting his sensors up until recently. Notified pt that PA can be submitted for Dexcom. Patient verbalized understanding.     Submitted PA for Dexcom G7 sensors and . May be denied since pt is not on insulin, MDI or Basal.     \"Is the patient using multiple daily insulin injections (MDI) or continuous subcutaneous insulin infusion (CSII)?\"

## 2024-12-15 RX ORDER — ATORVASTATIN CALCIUM 20 MG/1
20 TABLET, FILM COATED ORAL NIGHTLY
Qty: 90 TABLET | Refills: 3 | Status: SHIPPED | OUTPATIENT
Start: 2024-12-15

## 2024-12-15 NOTE — TELEPHONE ENCOUNTER
Refill passed per [facility] protocol.    Requested Prescriptions   Pending Prescriptions Disp Refills    ATORVASTATIN 20 MG Oral Tab [Pharmacy Med Name: ATORVASTATIN 20 MG TABLET] 90 tablet 1     Sig: TAKE 1 TABLET BY MOUTH EVERY DAY AT NIGHT       Cholesterol Medication Protocol Passed - 12/15/2024 10:23 AM        Passed - ALT < 80     Lab Results   Component Value Date    ALT 16 02/07/2024             Passed - ALT resulted within past year        Passed - Lipid panel within past 12 months     Lab Results   Component Value Date    CHOLEST 121 02/07/2024    TRIG 115 02/07/2024    HDL 38 (L) 02/07/2024    LDL 62 02/07/2024    VLDL 17 02/07/2024    NONHDLC 83 02/07/2024             Passed - In person appointment or virtual visit in the past 12 mos or appointment in next 3 mos     Recent Outpatient Visits              2 months ago Dyslipidemia    [facility] Mireille Zuñiga MD    Office Visit    4 months ago Essential hypertension    [facility] Crow Almeida MD    Office Visit    5 months ago Dyslipidemia    [facility] Mireille Zuñiga MD    Office Visit    10 months ago Annual physical exam    [facility] Crow Almeida MD    Office Visit    1 year ago Primary osteoarthritis of left knee    [facility] Katalina Gee PA-C    Office Visit          Future Appointments         Provider Department Appt Notes    In 3 weeks Mireille Zuñiga MD [facility] 3 months                         Future Appointments         Provider Department Appt Notes    In 3 weeks Mireille Zuñiga MD [facility] 3 months            Recent Outpatient Visits              2 months ago Dyslipidemia    [facility], Mireille Hughes MD    Office Visit    4 months ago Essential hypertension    [facility], [facility], Crow Perry MD    Office Visit    5 months ago Dyslipidemia    [facility], [facility], Mireille Hughes MD    Office Visit    10 months ago Annual physical exam    [facility], [facility], Crow Perry MD    Office Visit    1 year ago Primary osteoarthritis of left knee    [facility], Katalina Cowan PA-C    Office Visit

## 2024-12-16 NOTE — TELEPHONE ENCOUNTER
PA was denied:     Your plan only covers this product if you are using multiple daily insulin injections (MDI), insulin pump  therapy, or basal insulin. We reviewed the information we had. Your request has been denied.    Called pt and notified that sensors were denied. Patient verbalized understanding. Offered cashpay option for [employer] 2 sensors that were sent. Explained to pt that can get coupon from [employer] website.

## 2025-01-05 NOTE — TELEPHONE ENCOUNTER
Refill passed per [facility] protocol.  Medication will be refilled.  , please contact Patient and assist with an appointment to establish care with new PCP. Thanks     Requested Prescriptions   Pending Prescriptions Disp Refills    METFORMIN HCL 1000 MG Oral Tab [Pharmacy Med Name: METFORMIN HCL 1,000 MG TABLET] 180 tablet 1     Sig: TAKE 1 TABLET BY MOUTH TWICE A DAY WITH MEALS       Diabetes Medication Protocol Passed - 1/5/2025  1:34 PM        Passed - Last A1C < 7.5 and within past 6 months     Lab Results   Component Value Date    A1C 7.4 (A) 10/07/2024             Passed - In person appointment or virtual visit in the past 6 mos or appointment in next 3 mos     Recent Outpatient Visits              3 months ago Dyslipidemia    [facility] Mireille Zuñiga MD    Office Visit    5 months ago Essential hypertension    [facility] [place] [facility] [place] [facility]    Office Visit    6 months ago [facility]    [facility] Visit    11 months ago Annual physical exam    [facility] [place] [facility] [place] [facility]    Office Visit    1 year ago Primary osteoarthritis of left knee    [facility] Katalina Gee PA-C    Office Visit          Future Appointments         Provider Department Appt Notes    Tomorrow Mireille Zuñiga MD [facility] 3 months                    Passed - Microalbumin procedure in past 12 months or taking ACE/ARB        Passed - EGFRCR or GFRAA > 50     GFR Evaluation  EGFRCR: 82 , resulted on 2/7/2024          Passed - GFR in the past 12 months             Recent Outpatient Visits              3 months ago Dyslipidemia    [facility] [place] Mireille    Office Visit    5 months ago Essential hypertension    [facility] [place]    Office Visit    6 months ago Dyslipidemia    [facility] [place]    Office Visit    11 months ago Annual physical exam    [facility] [place] Crow Almeida MD    Office Visit    1 year ago Primary osteoarthritis of left knee    [facility]    Office Visit            Future Appointments         Provider Department Appt Notes    Tomorrow [facility]

## 2025-01-06 ENCOUNTER — OFFICE VISIT (OUTPATIENT)
Dept: ENDOCRINOLOGY CLINIC | Facility: CLINIC | Age: 70
End: 2025-01-06

## 2025-01-06 VITALS
SYSTOLIC BLOOD PRESSURE: 138 MMHG | DIASTOLIC BLOOD PRESSURE: 74 MMHG | HEART RATE: 76 BPM | WEIGHT: 252 LBS | BODY MASS INDEX: 32 KG/M2

## 2025-01-06 DIAGNOSIS — E11.65 TYPE 2 DIABETES MELLITUS WITH HYPERGLYCEMIA, WITHOUT LONG-TERM CURRENT USE OF INSULIN (HCC): Primary | ICD-10-CM

## 2025-01-06 DIAGNOSIS — E78.5 DYSLIPIDEMIA: ICD-10-CM

## 2025-01-06 LAB
GLUCOSE BLOOD: 132
HEMOGLOBIN A1C: 6 % (ref 4.3–5.6)
TEST STRIP LOT #: NORMAL NUMERIC

## 2025-01-06 NOTE — PROGRESS NOTES
FU VISIT     CHIEF COMPLAINT:    DM     HISTORY OF PRESENT ILLNESS:   Jan Casas is a 69 year old male who is here to FU  for DM.     DM HISTORY  Diagnosed:       HISTORY OF DIABETES COMPLICATIONS: :  History of Retinopathy: Jan 2024  - last eye exam: 2024 [facility]  History of Neuropathy: yes   History of Nephropathy: no     ASSOCIATED COMPLICATIONS:   HTN: yes  Hyperlipidemia: yes  Coronary Artery Disease:  denies  Cerebrovascular Disease: denies      HOME GLUCOSE READINGS:   CGM download as below      CURRENT DIABETIC MEDICATIONS INCLUDE:  Jardiance 10  mg daily   MTF 1000 mg BF and dinner  Ozempic 0.25 mg weekly     MEALS:  Moderate compliance  Two main meals a day   He works from 10 pm to 6 am and on some days 6 pm to 6 am     EXERCISE:  Very active at work      CURRENT MEDICATIONS:    Current Outpatient Medications   Medication Sig Dispense Refill    metFORMIN HCl 1000 MG Oral Tab Take 1 tablet (1,000 mg total) by mouth 2 (two) times daily with meals. 180 tablet 0    atorvastatin 20 MG Oral Tab Take 1 tablet (20 mg total) by mouth nightly. 90 tablet 3    Continuous Glucose  (DEXCOM G7 ) Does not apply Device 1 each As Directed. 1 each 0    Continuous Glucose Sensor (DEXCOM G7 SENSOR) Does not apply Misc 1 each As Directed. INSERT 1 SENSOR AT THE BACK OF THE ARM AND CHANGE EVERY 10 DAYS. 9 each 3    Continuous Glucose Sensor (FREESTYLE SINGH 2 SENSOR) Does not apply Misc 1 each every 14 (fourteen) days. 6 each 3    Losartan Potassium-HCTZ 100-12.5 MG Oral Tab Take 1 tablet by mouth daily. 90 tablet 3    Continuous Blood Gluc  (FREESTYLE SINGH 2 READER) Does not apply Device 1 each every 14 (fourteen) days. Use every 14 days 6 each 3    cholecalciferol 1000 UNITS Oral Cap Take 1 capsule (1,000 Units total) by mouth daily.      aspirin 325 MG Oral Tab Take 1 tablet (325 mg total) by mouth daily.      Multiple Vitamins-Minerals (PRESERVISION AREDS) Oral Tab Take 2 tablets by  mouth daily.      ACCU-CHEK MULTICLIX LANCETS Does not apply Misc TEST BLOOD 2 TIMES PER  each 3    semaglutide 2 MG/3ML Subcutaneous Solution Pen-injector Inject 0.5 mg into the skin once a week for 14 days, THEN 0.5 mg once a week. 9 mL 0    empagliflozin (JARDIANCE) 10 MG Oral Tab Take 1 tablet (10 mg total) by mouth daily. (Patient not taking: Reported on 2025) 90 tablet 3       PAST MEDICAL HISTORY:   Past Medical History:    Diabetes (HCC)    Dyslipidemia associated with type 2 diabetes mellitus (HCC)    Essential hypertension    Hx of adenomatous colonic polyps    None in . Repeat CLN in .    Peripheral sensory neuropathy due to type 2 diabetes mellitus (HCC)    Primary osteoarthritis of knees, bilateral    Type 2 diabetes mellitus (HCC)       PAST SURGICAL HISTORY:   Past Surgical History:   Procedure Laterality Date    Appendectomy         ALLERGIES:  No Known Allergies    SOCIAL HISTORY:    Social History     Socioeconomic History    Marital status:    Tobacco Use    Smoking status: Never    Smokeless tobacco: Never   Vaping Use    Vaping status: Never Used   Substance and Sexual Activity    Alcohol use: No    Drug use: No       FAMILY HISTORY:   Family History   Problem Relation Age of Onset    Hypertension Mother     Diabetes Mother     Other (CVA) Mother          age 83    Diabetes Sister     Hypertension Sister        ASSESSMENTS:        REVIEW OF SYSTEMS:  Constitutional: Negative for: weight change, fever, fatigue, cold/heat intolerance  Eyes: Negative for:  Visual changes, proptosis, blurring, floaters, poor night vision, impaired color vision  ENT: Negative for:  dysphagia, neck swelling, dysphonia  Respiratory: Negative for:  dyspnea, cough  Cardiovascular: Negative for:  chest pain, palpitations, orthopnea  GI: Negative for:  abdominal pain, nausea, vomiting, diarrhea, constipation, bleeding  Neurology: Negative for: headache, numbness, weakness,    Genito-Urinary: Negative for: dysuria, frequency  Psychiatric: Negative for:  depression, anxiety  Hematology/Lymphatics: Negative for: bruising, lower extremity edema  Endocrine: Negative for: polydypsia,  polyuria  Skin: Negative for: rash, blister,      PHYSICAL EXAM:   Vitals:    01/06/25 1340   BP: 138/74   Pulse: 76   Weight: 252 lb (114.3 kg)     BMI: Body mass index is 32.35 kg/m².         General Appearance:  alert, well developed, in no acute distress  Head: Atraumatic  Eyes:  normal conjunctivae, sclera., normal sclera and normal pupils  Throat/Neck: normal sound to voice. Normal hearing, normal speech  Respiratory:  Speaking in full sentences, non-labored. no increased work of breathing, no audible wheezing    Neuro: motor grossly intact, moving all extremities without difficulty  Psychiatric:  oriented to time, self, and place  Extremities: July 2024  Bilateral barefoot skin diabetic exam is normal, visualized feet and the appearance is normal.  Bilateral monofilament/sensation of both feet is decrease  Pulsation pedal pulse exam of both lower legs/feet is normal as well.            DATA:     Pertinent data reviewed    A1c is 6.0 %     ASSESSMENT AND PLAN:    1.  Type 2 DM:  with hyperglycemia     Plan:  Discussed the pathogenesis, natural course of diabetes. Patient understands the importance of glycemic control and the implications of uncontrolled diabetes including Diabetic ketoacidosis and various micro vascular and macrovascular complications.        a). Medications:    Continuous Glucose Monitoring Interpretation    Jan Casas has undergone continuous glucose monitoring with the personal alphonso  He was evaluated with the alphonso from 12/10/2024 - 01/06/2025 (28 days)  Generated: 01/06/2025  % Time CGM Active: 19%        Glucose Statistics and Targets  Average Glucose: 137 mg/dL  Glucose Management Indicator (GMI): N/A  Glucose Variability (%CV): 19.8%  Target Range: 70 - 180 mg/dL        Time  in Ranges  Very High: >250 mg/dL --- 0%  High: 181 - 250 mg/dL --- 7%  Target Range: 70 - 180 mg/dL --- 93%  Low: 54 - 69 mg/dL --- 0%  Very Low: <54 mg/dL --- 0%            As a result of his testing the following plan was made:     Metformin 1000 mg BF and dinner  Take with food  GI SE reviewed    STOP jardiance       Ozempic 0.25 --> 0.5 mg weekly   No personal or family history of MEN syndrome  Patient counselled regarding side effects including injection site reactions, nausea, vomiting, diarrhea, pancreatitis, gastroparesis and rare side effect helen Jacob syndrome.    Check and call with sugars as discussed   Will eventually stop the jardiance if he responds well to the ozempic    b). No Nephropathy  c). Discussed importance of annual eye exams  d). Foot exam: Daily feet exam explained  e). BG log maintainence explained in great detail, to get log and glucometer on next visit.  f). Life style changes reviewed  g). Hypoglycemia recognition and management discussed    2. Patient’s BP is  normal today  3. Dyslipidemia  A) Discussed lifestyle modifications including reductions in dietary total and saturated fat, weight loss, aerobic exercise, and eating a diet rich in fruits and vegetables.  B) Statin therapy  Discussed the potential side effects of statins including muscle and liver injury.    Check and call with sugars as discussed  Fasting labs as below      Orders Placed This Encounter   Procedures    POC HemoCue Glucose 201 (Finger stick glucose)    POC Glycohemoglobin [25804]    Comp Metabolic Panel [E]    Microalb/Creat Ratio, Random Urine [E]    Lipid Panel [E]         Mireille Zuñiga MD

## 2025-01-06 NOTE — PROGRESS NOTES
Name: Jan Casas  YOB: 1955  Report Period: 12/10/2024 - 01/06/2025 (28 days)  Generated: 01/06/2025  % Time CGM Active: 19%      Glucose Statistics and Targets  Average Glucose: 137 mg/dL  Glucose Management Indicator (GMI): N/A  Glucose Variability (%CV): 19.8%  Target Range: 70 - 180 mg/dL      Time in Ranges  Very High: >250 mg/dL --- 0%  High: 181 - 250 mg/dL --- 7%  Target Range: 70 - 180 mg/dL --- 93%  Low: 54 - 69 mg/dL --- 0%  Very Low: <54 mg/dL --- 0%

## 2025-01-24 ENCOUNTER — TELEPHONE (OUTPATIENT)
Dept: INTERNAL MEDICINE CLINIC | Facility: CLINIC | Age: 70
End: 2025-01-24

## 2025-01-24 NOTE — TELEPHONE ENCOUNTER
COMPREHENSIVE MEDICATION REVIEW         Jan Casas MRN [id number]    1955 PCP Crow Almeida MD     Comments: Medication history completed by [facility]ulatory [facility] Pharmacist over the phone on 25. Patient has upcoming AWV with PCP on 25.     After thorough medication review, 3 discrepancies have been identified and corrected on patient's medication list. See updated list below:     Outpatient Encounter Medications as of 2025   Medication Sig    semaglutide 2 MG/3ML Subcutaneous Solution Pen-injector Inject 0.5 mg into the skin once a week.    metFORMIN HCl 1000 MG Oral Tab Take 1 tablet (1,000 mg total) by mouth 2 (two) times daily with meals.    atorvastatin 20 MG Oral Tab Take 1 tablet (20 mg total) by mouth nightly.    Losartan Potassium-HCTZ 100-12.5 MG Oral Tab Take 1 tablet by mouth daily.    cholecalciferol 1000 UNITS Oral Cap Take 1 capsule (1,000 Units total) by mouth every other day.    aspirin 325 MG Oral Tab Take 1 tablet (325 mg total) by mouth daily.    Multiple Vitamins-Minerals (PRESERVISION AREDS) Oral Tab Take 2 tablets by mouth daily.     Medication Assessment:   Reviewed all medications in detail with patient including dose, indication, timing of administration, monitoring parameters, and potential side effects of medications.     Patient reports taking losartan-hydrochlorothiazide 100-12.5 mg daily as prescribed. He does not monitor his blood pressure at home. Did recommend he monitor his blood pressure 2-3 times weekly and bring readings to all MD appointments for review.     Patient follows closely with endocrinologist. He tells me he was taken off Jardiance - removed from med list. He is currently taking Ozempic 0.5 mg weekly and metformin 1000 mg twice daily as prescribed. Congratulated patient on improvement in A1c.     Did provide education and stressed the importance of taking medication just like prescribed to get the most benefit. Patient denies forgetting or  missing medication doses and denies any questions or concerns with medications at this time.     Thank you,    Joleen Elizalde, PharmD, 1/24/2025, 1:53 PM

## 2025-01-27 ENCOUNTER — OFFICE VISIT (OUTPATIENT)
Dept: INTERNAL MEDICINE CLINIC | Facility: CLINIC | Age: 70
End: 2025-01-27

## 2025-01-27 VITALS
WEIGHT: 247.19 LBS | SYSTOLIC BLOOD PRESSURE: 149 MMHG | HEIGHT: 74 IN | OXYGEN SATURATION: 97 % | HEART RATE: 77 BPM | DIASTOLIC BLOOD PRESSURE: 90 MMHG | BODY MASS INDEX: 31.72 KG/M2

## 2025-01-27 DIAGNOSIS — E11.40 TYPE 2 DIABETES MELLITUS WITH DIABETIC NEUROPATHY, WITHOUT LONG-TERM CURRENT USE OF INSULIN (HCC): Primary | ICD-10-CM

## 2025-01-27 DIAGNOSIS — I10 ESSENTIAL HYPERTENSION: ICD-10-CM

## 2025-01-27 DIAGNOSIS — M17.0 PRIMARY OSTEOARTHRITIS OF KNEES, BILATERAL: ICD-10-CM

## 2025-01-27 DIAGNOSIS — E11.69 DYSLIPIDEMIA ASSOCIATED WITH TYPE 2 DIABETES MELLITUS (HCC): ICD-10-CM

## 2025-01-27 DIAGNOSIS — E78.5 DYSLIPIDEMIA ASSOCIATED WITH TYPE 2 DIABETES MELLITUS (HCC): ICD-10-CM

## 2025-01-27 PROCEDURE — 99214 OFFICE O/P EST MOD 30 MIN: CPT | Performed by: INTERNAL MEDICINE

## 2025-01-27 PROCEDURE — 3077F SYST BP >= 140 MM HG: CPT | Performed by: INTERNAL MEDICINE

## 2025-01-27 PROCEDURE — 3080F DIAST BP >= 90 MM HG: CPT | Performed by: INTERNAL MEDICINE

## 2025-01-27 PROCEDURE — 3008F BODY MASS INDEX DOCD: CPT | Performed by: INTERNAL MEDICINE

## 2025-01-27 NOTE — PATIENT INSTRUCTIONS
No medication changes today.     Check your blood pressure every day. Please make sure you are calm and have been resting for 5-10 mins prior to checking the blood pressure. Please write down the values in a log. Please bring the log and the machine in with you when you come in.     Please come back in 2 weeks for a follow up.

## 2025-01-27 NOTE — PROGRESS NOTES
Jan Casas is a 70 year old male with complaints of:  Chief Complaint: Establish [facility]    HPI     Jan Casas is a(n) 70 year old male with a history of diabetes, hyperlipidemia, hypertension, diabetic neuropathy, osteoarthritis, who presents to [facility].    Patient is a history of diabetes mellitus.  Patient is following with endocrinology.  Last seen 1/6/2025, where Jardiance was stopped, and Ozempic was increased to 0.5 mg weekly.  Currently continues on metformin 1000 mg twice daily, and Ozempic 0.5 mg weekly.  Currently has a continuous glucose monitor.  Within target range 93% of the time.  Last hemoglobin A1c was done 1/6/2025, was 6.0.  Last urine microalbumin creatinine ratio was done 2/7/2024, was within normal limits.  Due to see optho for the next week.     Patient is a history of hyperlipidemia.  Continues on atorvastatin 20 mg daily.    Patient has a history of hypertension.  Continues on losartan-hydrochlorothiazide 100-12.5 mg daily. BP is 149/90, same upon recheck. Does not check not home. Patient denies chest pain, chest pressure, chest tightness shortness of breath, lower extremity swelling, paroxysmal nocturnal dyspnea, pillow orthopnea, palpitations, diaphoresis, headache, vision changes, lightheadedness, or dizziness.      Osteoarthritis. Did not end up getting the knee replaced d/t having uncontrolled DM at the time. He is wearing a knee brace, which is helping.          Past Medical History     Past Medical History:    Diabetes (HCC)    Dyslipidemia associated with type 2 diabetes mellitus (HCC)    Essential hypertension    Hx of adenomatous colonic polyps    None in 2022. Repeat CLN in 2027.    Peripheral sensory neuropathy due to type 2 diabetes mellitus (HCC)    Primary osteoarthritis of knees, bilateral    Type 2 diabetes mellitus (HCC)        Past Surgical History     Past Surgical History:   Procedure Laterality Date    Appendectomy  1980        Family History     Family  History   Problem Relation Age of Onset    Hypertension Mother     Diabetes Mother     Other (CVA) Mother          age 83    Diabetes Sister     Hypertension Sister        Social History     Social History     Socioeconomic History    Marital status:    Tobacco Use    Smoking status: Never    Smokeless tobacco: Never   Vaping Use    Vaping status: Never Used   Substance and Sexual Activity    Alcohol use: No    Drug use: No     Social Drivers of Health     Food Insecurity: No Food Insecurity (2025)    NCSS - Food Insecurity     Worried About Running Out of Food in the Last Year: No     Ran Out of Food in the Last Year: No   Transportation Needs: No Transportation Needs (2025)    NCSS - Transportation     Lack of Transportation: No   Housing Stability: Not At Risk (2025)    NCSS - Housing/Utilities     Has Housing: Yes     Worried About Losing Housing: No     Unable to Get Utilities: No       Allergies     Allergies[1]    Current Medications     Current Outpatient Medications   Medication Sig Dispense Refill    semaglutide 2 MG/3ML Subcutaneous Solution Pen-injector Inject 0.5 mg into the skin once a week.      metFORMIN HCl 1000 MG Oral Tab Take 1 tablet (1,000 mg total) by mouth 2 (two) times daily with meals. 180 tablet 0    atorvastatin 20 MG Oral Tab Take 1 tablet (20 mg total) by mouth nightly. 90 tablet 3    Continuous Glucose  (DEXCOM G7 ) Does not apply Device 1 each As Directed. 1 each 0    Continuous Glucose Sensor (DEXCOM G7 SENSOR) Does not apply Misc 1 each As Directed. INSERT 1 SENSOR AT THE BACK OF THE ARM AND CHANGE EVERY 10 DAYS. 9 each 3    Continuous Glucose Sensor (FREESTYLE SINGH 2 SENSOR) Does not apply Misc 1 each every 14 (fourteen) days. 6 each 3    Losartan Potassium-HCTZ 100-12.5 MG Oral Tab Take 1 tablet by mouth daily. 90 tablet 3    Continuous Blood Gluc  (FREESTYLE SINGH 2 READER) Does not apply Device 1 each every 14 (fourteen) days.  Use every 14 days 6 each 3    cholecalciferol 1000 UNITS Oral Cap Take 1 capsule (1,000 Units total) by mouth every other day.      aspirin 325 MG Oral Tab Take 1 tablet (325 mg total) by mouth daily.      Multiple Vitamins-Minerals (PRESERVISION AREDS) Oral Tab Take 2 tablets by mouth daily.       No current facility-administered medications for this visit.       Review of Systems     GENERAL HEALTH: feels well otherwise  SKIN: denies any unusual skin lesions or rashes  RESPIRATORY: denies shortness of breath with exertion  CARDIOVASCULAR: denies chest pain on exertion  GI: denies abdominal pain and denies heartburn  : denies any burning with urination, urinary frequency or urgency  NEURO: denies headaches, numbness or tingling, mental status changes  PSYCH: denies depressed mood, anxiety  MUSC: denies muscle aches, joint pain    Physical Exam     /90 (BP Location: Right arm, Patient Position: Sitting, Cuff Size: large)   Pulse 77   Ht 6' 2\" (1.88 m)   Wt 247 lb 3.2 oz (112.1 kg)   SpO2 97%   BMI 31.74 kg/m²     GENERAL: well developed, well nourished,in no apparent distress  SKIN: no rashes,no suspicious lesions  HEENT: atraumatic, normocephalic,ears and throat are clear  NECK: supple,no adenopathy,no bruits  LUNGS: clear to auscultation  CARDIO: RRR without murmur  GI: good BS's,no masses, HSM or tenderness  EXTREMITIES: no cyanosis, clubbing or edema    Assessment and Plan     Diagnoses and all orders for this visit:    Type 2 diabetes mellitus with diabetic neuropathy, without long-term current use of insulin (HCC).  Continue current medications.  Follow with endocrinology.  Follow-up with ophthalmology in the next 1 to 2 weeks.  Will do foot exam at next physical.    Essential hypertension.  Blood pressure elevated at 149/90, commensurate upon recheck.  Check blood pressure at home for every day for 2 weeks.  Bring log and machine in in 2 weeks for follow-up.  Continue current medications for  now.    Dyslipidemia associated with type 2 diabetes mellitus (HCC).  Continue current medications.     Primary osteoarthritis of knees, bilateral.  Continue wearing brace.  Follow-up with orthopedics as needed.          semaglutide 2 MG/3ML Subcutaneous Solution Pen-injector Inject 0.5 mg into the skin once a week.      metFORMIN HCl 1000 MG Oral Tab Take 1 tablet (1,000 mg total) by mouth 2 (two) times daily with meals. 180 tablet 0    atorvastatin 20 MG Oral Tab Take 1 tablet (20 mg total) by mouth nightly. 90 tablet 3    Continuous Glucose  (DEXCOM G7 ) Does not apply Device 1 each As Directed. 1 each 0    Continuous Glucose Sensor (DEXCOM G7 SENSOR) Does not apply Misc 1 each As Directed. INSERT 1 SENSOR AT THE BACK OF THE ARM AND CHANGE EVERY 10 DAYS. 9 each 3    Continuous Glucose Sensor (FREESTYLE SINGH 2 SENSOR) Does not apply Misc 1 each every 14 (fourteen) days. 6 each 3    Losartan Potassium-HCTZ 100-12.5 MG Oral Tab Take 1 tablet by mouth daily. 90 tablet 3    Continuous Blood Gluc  (FREESTYLE SINGH 2 READER) Does not apply Device 1 each every 14 (fourteen) days. Use every 14 days 6 each 3    cholecalciferol 1000 UNITS Oral Cap Take 1 capsule (1,000 Units total) by mouth every other day.      aspirin 325 MG Oral Tab Take 1 tablet (325 mg total) by mouth daily.      Multiple Vitamins-Minerals (PRESERVISION AREDS) Oral Tab Take 2 tablets by mouth daily.         Requested Prescriptions      No prescriptions requested or ordered in this encounter       No orders of the defined types were placed in this encounter.      No follow-ups on file.    The patient indicates understanding of these issues and agrees to the plan.    Electronically signed by Liane Eli MD 01/27/25             [1] No Known Allergies

## 2025-02-07 NOTE — TELEPHONE ENCOUNTER
Endocrine Refill protocol for oral and injectable diabetic medications    Protocol Criteria:  PASSED  Reason: N/A    If all below requirements are met, send a 90-day supply with 1 refill per provider protocol.    Verify appointment with Endocrinology completed in the last 6 months or scheduled in the next 3 months.  Verify A1C has been completed within the last 6 months and is below 8.5%     Last completed office visit: 1/6/2025 Mireille Zuñiga MD   Next scheduled Follow up:   Future Appointments   Date Time Provider Department Center          6/9/2025  1:15 PM Mireille Zuñiga MD [facility]      Last A1c result: Last A1c value was 6% done 1/6/2025.

## 2025-02-10 ENCOUNTER — OFFICE VISIT (OUTPATIENT)
Dept: INTERNAL MEDICINE CLINIC | Facility: CLINIC | Age: 70
End: 2025-02-10

## 2025-02-10 VITALS
OXYGEN SATURATION: 96 % | HEIGHT: 74 IN | HEART RATE: 88 BPM | DIASTOLIC BLOOD PRESSURE: 78 MMHG | WEIGHT: 249 LBS | SYSTOLIC BLOOD PRESSURE: 149 MMHG | BODY MASS INDEX: 31.95 KG/M2

## 2025-02-10 DIAGNOSIS — I10 ESSENTIAL HYPERTENSION: Primary | ICD-10-CM

## 2025-02-10 PROCEDURE — 3008F BODY MASS INDEX DOCD: CPT | Performed by: INTERNAL MEDICINE

## 2025-02-10 PROCEDURE — 99214 OFFICE O/P EST MOD 30 MIN: CPT | Performed by: INTERNAL MEDICINE

## 2025-02-10 PROCEDURE — 3077F SYST BP >= 140 MM HG: CPT | Performed by: INTERNAL MEDICINE

## 2025-02-10 PROCEDURE — 3078F DIAST BP <80 MM HG: CPT | Performed by: INTERNAL MEDICINE

## 2025-02-10 RX ORDER — LOSARTAN POTASSIUM AND HYDROCHLOROTHIAZIDE 25; 100 MG/1; MG/1
1 TABLET ORAL DAILY
Qty: 90 TABLET | Refills: 3 | Status: SHIPPED | OUTPATIENT
Start: 2025-02-10 | End: 2026-02-05

## 2025-02-10 RX ORDER — SEMAGLUTIDE 0.68 MG/ML
0.5 INJECTION, SOLUTION SUBCUTANEOUS WEEKLY
Qty: 9 ML | Refills: 1 | Status: SHIPPED | OUTPATIENT
Start: 2025-02-10

## 2025-02-10 NOTE — PROGRESS NOTES
Jan Casas is a 70 year old male with complaints of:  Chief Complaint: Follow - Up (Blood pressure follow up)    HPI     Jan Csaas is a(n) 70 year old male with a history of diabetes, hyperlipidemia, hypertension, diabetic neuropathy, osteoarthritis, who presents for follow up.    -150s/80s at home. 149/78 today in office. Patient denies chest pain, chest pressure, chest tightness shortness of breath, lower extremity swelling, paroxysmal nocturnal dyspnea, pillow orthopnea, palpitations, diaphoresis, headache, vision changes, lightheadedness, or dizziness.       Past Medical History     Past Medical History:    Diabetes (HCC)    Dyslipidemia associated with type 2 diabetes mellitus (HCC)    Essential hypertension    Hx of adenomatous colonic polyps    None in . Repeat CLN in .    Peripheral sensory neuropathy due to type 2 diabetes mellitus (HCC)    Primary osteoarthritis of knees, bilateral    Type 2 diabetes mellitus (HCC)        Past Surgical History     Past Surgical History:   Procedure Laterality Date    Appendectomy          Family History     Family History   Problem Relation Age of Onset    Hypertension Mother     Diabetes Mother     Other (CVA) Mother          age 83    Diabetes Sister     Hypertension Sister        Social History     Social History     Socioeconomic History    Marital status:    Tobacco Use    Smoking status: Never    Smokeless tobacco: Never   Vaping Use    Vaping status: Never Used   Substance and Sexual Activity    Alcohol use: No    Drug use: No     Social Drivers of Health     Food Insecurity: No Food Insecurity (2025)    NCSS - Food Insecurity     Worried About Running Out of Food in the Last Year: No     Ran Out of Food in the Last Year: No   Transportation Needs: No Transportation Needs (2025)    NCSS - Transportation     Lack of Transportation: No   Housing Stability: Not At Risk (2025)    NCSS - Housing/Utilities     Has  Housing: Yes     Worried About Losing Housing: No     Unable to Get Utilities: No       Allergies     Allergies[1]    Current Medications     Current Outpatient Medications   Medication Sig Dispense Refill    semaglutide (OZEMPIC, 0.25 OR 0.5 MG/DOSE,) 2 MG/3ML Subcutaneous Solution Pen-injector Inject 0.5 mg into the skin once a week. 9 mL 1    metFORMIN HCl 1000 MG Oral Tab Take 1 tablet (1,000 mg total) by mouth 2 (two) times daily with meals. 180 tablet 0    atorvastatin 20 MG Oral Tab Take 1 tablet (20 mg total) by mouth nightly. 90 tablet 3    Continuous Glucose  (DEXCOM G7 ) Does not apply Device 1 each As Directed. 1 each 0    Continuous Glucose Sensor (DEXCOM G7 SENSOR) Does not apply Misc 1 each As Directed. INSERT 1 SENSOR AT THE BACK OF THE ARM AND CHANGE EVERY 10 DAYS. 9 each 3    Continuous Glucose Sensor (FREESTYLE SINGH 2 SENSOR) Does not apply Misc 1 each every 14 (fourteen) days. 6 each 3    Losartan Potassium-HCTZ 100-12.5 MG Oral Tab Take 1 tablet by mouth daily. 90 tablet 3    Continuous Blood Gluc  (FREESTYLE SINGH 2 READER) Does not apply Device 1 each every 14 (fourteen) days. Use every 14 days 6 each 3    cholecalciferol 1000 UNITS Oral Cap Take 1 capsule (1,000 Units total) by mouth every other day.      aspirin 325 MG Oral Tab Take 1 tablet (325 mg total) by mouth daily.      Multiple Vitamins-Minerals (PRESERVISION AREDS) Oral Tab Take 2 tablets by mouth daily.       No current facility-administered medications for this visit.       Review of Systems     GENERAL HEALTH: feels well otherwise  SKIN: denies any unusual skin lesions or rashes  RESPIRATORY: denies shortness of breath with exertion  CARDIOVASCULAR: denies chest pain on exertion  GI: denies abdominal pain and denies heartburn  : denies any burning with urination, urinary frequency or urgency  NEURO: denies headaches, numbness or tingling, mental status changes  PSYCH: denies depressed mood,  anxiety  MUSC: denies muscle aches, joint pain    Physical Exam     /78 (BP Location: Right arm, Patient Position: Sitting, Cuff Size: adult)   Pulse 88   Ht 6' 2\" (1.88 m)   Wt 249 lb (112.9 kg)   SpO2 96%   BMI 31.97 kg/m²     GENERAL: well developed, well nourished,in no apparent distress  SKIN: no rashes,no suspicious lesions  HEENT: atraumatic, normocephalic,ears and throat are clear  NECK: supple,no adenopathy,no bruits  LUNGS: clear to auscultation  CARDIO: RRR without murmur  GI: good BS's,no masses, HSM or tenderness  EXTREMITIES: no cyanosis, clubbing or edema    Assessment and Plan     Diagnoses and all orders for this visit:    Essential hypertension.  Increase losartan-hydrochlorothiazide 100-12.5 milligrams daily to losartan hydrochlorothiazide 100-25 mg daily.  Patient tolerating this medication reasonably well.  Does have a history of intermittent hyponatremia, but this is likely pseudohyponatremia due to hyperglycemia.  No history of hypokalemia.  Plan to recheck BMP at next visit.  Continue checking blood pressure at home.  Follow-up in 2 weeks.  -     losartan-hydroCHLOROthiazide 100-25 MG Oral Tab; Take 1 tablet by mouth daily.          semaglutide (OZEMPIC, 0.25 OR 0.5 MG/DOSE,) 2 MG/3ML Subcutaneous Solution Pen-injector Inject 0.5 mg into the skin once a week. 9 mL 1    losartan-hydroCHLOROthiazide 100-25 MG Oral Tab Take 1 tablet by mouth daily. 90 tablet 3    metFORMIN HCl 1000 MG Oral Tab Take 1 tablet (1,000 mg total) by mouth 2 (two) times daily with meals. 180 tablet 0    atorvastatin 20 MG Oral Tab Take 1 tablet (20 mg total) by mouth nightly. 90 tablet 3    Continuous Glucose  (DEXCOM G7 ) Does not apply Device 1 each As Directed. 1 each 0    Continuous Glucose Sensor (DEXCOM G7 SENSOR) Does not apply Misc 1 each As Directed. INSERT 1 SENSOR AT THE BACK OF THE ARM AND CHANGE EVERY 10 DAYS. 9 each 3    Continuous Glucose Sensor (FREESTYLE SINGH 2 SENSOR) Does  not apply Misc 1 each every 14 (fourteen) days. 6 each 3    Continuous Blood Gluc  (FREESTYLE SINGH 2 READER) Does not apply Device 1 each every 14 (fourteen) days. Use every 14 days 6 each 3    cholecalciferol 1000 UNITS Oral Cap Take 1 capsule (1,000 Units total) by mouth every other day.      aspirin 325 MG Oral Tab Take 1 tablet (325 mg total) by mouth daily.      Multiple Vitamins-Minerals (PRESERVISION AREDS) Oral Tab Take 2 tablets by mouth daily.         Requested Prescriptions     Signed Prescriptions Disp Refills    losartan-hydroCHLOROthiazide 100-25 MG Oral Tab 90 tablet 3     Sig: Take 1 tablet by mouth daily.       No orders of the defined types were placed in this encounter.      No follow-ups on file.    The patient indicates understanding of these issues and agrees to the plan.    Electronically signed by Liane Eli MD 02/10/25             [1] No Known Allergies

## 2025-02-18 ENCOUNTER — TELEPHONE (OUTPATIENT)
Dept: INTERNAL MEDICINE CLINIC | Facility: CLINIC | Age: 70
End: 2025-02-18

## 2025-02-21 ENCOUNTER — TELEPHONE (OUTPATIENT)
Dept: ENDOCRINOLOGY CLINIC | Facility: CLINIC | Age: 70
End: 2025-02-21

## 2025-02-21 NOTE — TELEPHONE ENCOUNTER
Received a fax of patients most recent eye exam dated on 2/17/25 with Wendy Black OD at [facility]. Eye exam was documented in patient's 'Diabetes Flowsheet' and placed in providers folder for review.

## 2025-02-24 ENCOUNTER — OFFICE VISIT (OUTPATIENT)
Dept: INTERNAL MEDICINE CLINIC | Facility: CLINIC | Age: 70
End: 2025-02-24

## 2025-02-24 VITALS
WEIGHT: 249.19 LBS | TEMPERATURE: 98 F | HEART RATE: 88 BPM | SYSTOLIC BLOOD PRESSURE: 146 MMHG | BODY MASS INDEX: 31.98 KG/M2 | RESPIRATION RATE: 18 BRPM | HEIGHT: 74 IN | OXYGEN SATURATION: 96 % | DIASTOLIC BLOOD PRESSURE: 80 MMHG

## 2025-02-24 DIAGNOSIS — I10 ESSENTIAL HYPERTENSION: Primary | ICD-10-CM

## 2025-02-24 PROCEDURE — 3008F BODY MASS INDEX DOCD: CPT | Performed by: INTERNAL MEDICINE

## 2025-02-24 PROCEDURE — 3079F DIAST BP 80-89 MM HG: CPT | Performed by: INTERNAL MEDICINE

## 2025-02-24 PROCEDURE — 3077F SYST BP >= 140 MM HG: CPT | Performed by: INTERNAL MEDICINE

## 2025-02-24 PROCEDURE — 99214 OFFICE O/P EST MOD 30 MIN: CPT | Performed by: INTERNAL MEDICINE

## 2025-02-24 NOTE — PROGRESS NOTES
Jan Casas is a 70 year old male with complaints of:  Chief Complaint: Follow - Up    HPI     Jan Casas is a(n) 70 year old male with a history of history of diabetes, hyperlipidemia, hypertension, diabetic neuropathy, osteoarthritis, who presents for follow-up.    Patient has been checking his blood pressure at home.  First seen on 2025, her blood pressure was elevated to 140s over 90s.  Patient at that time was on losartan-hydrochlorothiazide 100-12.5 mg daily.  He was asked to keep a log of blood pressures at home, and followed up on 2/10/2025.Blood pressure was 130s over 150s over 80s at home.  At that time, losartan-hydrochlorothiazide 100-12.5 was increased to 100-25 mg daily.  Patient blood pressures at home now are between 115-136/60s-80s, though BP remains mildly elevated here.     Past Medical History     Past Medical History:    Diabetes (HCC)    Dyslipidemia associated with type 2 diabetes mellitus (HCC)    Essential hypertension    Hx of adenomatous colonic polyps    None in . Repeat CLN in .    Peripheral sensory neuropathy due to type 2 diabetes mellitus (HCC)    Primary osteoarthritis of knees, bilateral    Type 2 diabetes mellitus (HCC)        Past Surgical History     Past Surgical History:   Procedure Laterality Date    Appendectomy          Family History     Family History   Problem Relation Age of Onset    Hypertension Mother     Diabetes Mother     Other (CVA) Mother          age 83    Diabetes Sister     Hypertension Sister        Social History     Social History     Socioeconomic History    Marital status:    Tobacco Use    Smoking status: Never    Smokeless tobacco: Never   Vaping Use    Vaping status: Never Used   Substance and Sexual Activity    Alcohol use: No    Drug use: No     Social Drivers of Health     Food Insecurity: No Food Insecurity (2025)    NCSS - Food Insecurity     Worried About Running Out of Food in the Last Year: No     Ran  Out of Food in the Last Year: No   Transportation Needs: No Transportation Needs (1/27/2025)    NCSS - Transportation     Lack of Transportation: No   Housing Stability: Not At Risk (1/27/2025)    NCSS - Housing/Utilities     Has Housing: Yes     Worried About Losing Housing: No     Unable to Get Utilities: No       Allergies     Allergies[1]    Current Medications     Current Outpatient Medications   Medication Sig Dispense Refill    semaglutide (OZEMPIC, 0.25 OR 0.5 MG/DOSE,) 2 MG/3ML Subcutaneous Solution Pen-injector Inject 0.5 mg into the skin once a week. 9 mL 1    losartan-hydroCHLOROthiazide 100-25 MG Oral Tab Take 1 tablet by mouth daily. 90 tablet 3    metFORMIN HCl 1000 MG Oral Tab Take 1 tablet (1,000 mg total) by mouth 2 (two) times daily with meals. 180 tablet 0    atorvastatin 20 MG Oral Tab Take 1 tablet (20 mg total) by mouth nightly. 90 tablet 3    Continuous Glucose  (DEXCOM G7 ) Does not apply Device 1 each As Directed. 1 each 0    Continuous Glucose Sensor (DEXCOM G7 SENSOR) Does not apply Misc 1 each As Directed. INSERT 1 SENSOR AT THE BACK OF THE ARM AND CHANGE EVERY 10 DAYS. 9 each 3    Continuous Glucose Sensor (FREESTYLE SINGH 2 SENSOR) Does not apply Misc 1 each every 14 (fourteen) days. 6 each 3    Continuous Blood Gluc  (FREESTYLE SINGH 2 READER) Does not apply Device 1 each every 14 (fourteen) days. Use every 14 days 6 each 3    cholecalciferol 1000 UNITS Oral Cap Take 1 capsule (1,000 Units total) by mouth every other day.      aspirin 325 MG Oral Tab Take 1 tablet (325 mg total) by mouth daily.      Multiple Vitamins-Minerals (PRESERVISION AREDS) Oral Tab Take 2 tablets by mouth daily.       No current facility-administered medications for this visit.       Review of Systems     GENERAL HEALTH: feels well otherwise  SKIN: denies any unusual skin lesions or rashes  RESPIRATORY: denies shortness of breath with exertion  CARDIOVASCULAR: denies chest pain on  exertion  GI: denies abdominal pain and denies heartburn  : denies any burning with urination, urinary frequency or urgency  NEURO: denies headaches, numbness or tingling, mental status changes  PSYCH: denies depressed mood, anxiety  MUSC: denies muscle aches, joint pain    Physical Exam     /81   Pulse 88   Temp 98.4 °F (36.9 °C) (Oral)   Resp 18   Ht 6' 2\" (1.88 m)   Wt 249 lb 3.2 oz (113 kg)   SpO2 96%   BMI 32.00 kg/m²     GENERAL: well developed, well nourished,in no apparent distress  SKIN: no rashes,no suspicious lesions  HEENT: atraumatic, normocephalic,ears and throat are clear  NECK: supple,no adenopathy,no bruits  LUNGS: clear to auscultation  CARDIO: RRR without murmur  GI: good BS's,no masses, HSM or tenderness  EXTREMITIES: no cyanosis, clubbing or edema    Assessment and Plan     Assessment & Plan  Essential hypertension  Continue losartan-hydrochlorothiazide 100-25 milligrams daily.  Patient will be having labs done next month that have been ordered by his endocrinologist. Will follow up on Na, K, and kidney function. Follow up with me in 6 months.              semaglutide (OZEMPIC, 0.25 OR 0.5 MG/DOSE,) 2 MG/3ML Subcutaneous Solution Pen-injector Inject 0.5 mg into the skin once a week. 9 mL 1    losartan-hydroCHLOROthiazide 100-25 MG Oral Tab Take 1 tablet by mouth daily. 90 tablet 3    metFORMIN HCl 1000 MG Oral Tab Take 1 tablet (1,000 mg total) by mouth 2 (two) times daily with meals. 180 tablet 0    atorvastatin 20 MG Oral Tab Take 1 tablet (20 mg total) by mouth nightly. 90 tablet 3    Continuous Glucose  (DEXCOM G7 ) Does not apply Device 1 each As Directed. 1 each 0    Continuous Glucose Sensor (DEXCOM G7 SENSOR) Does not apply Misc 1 each As Directed. INSERT 1 SENSOR AT THE BACK OF THE ARM AND CHANGE EVERY 10 DAYS. 9 each 3    Continuous Glucose Sensor (FREESTYLE SINGH 2 SENSOR) Does not apply Misc 1 each every 14 (fourteen) days. 6 each 3    Continuous Blood  Gluc  (FREESTYLE SINGH 2 READER) Does not apply Device 1 each every 14 (fourteen) days. Use every 14 days 6 each 3    cholecalciferol 1000 UNITS Oral Cap Take 1 capsule (1,000 Units total) by mouth every other day.      aspirin 325 MG Oral Tab Take 1 tablet (325 mg total) by mouth daily.      Multiple Vitamins-Minerals (PRESERVISION AREDS) Oral Tab Take 2 tablets by mouth daily.         Requested Prescriptions      No prescriptions requested or ordered in this encounter       No orders of the defined types were placed in this encounter.      No follow-ups on file.    The patient indicates understanding of these issues and agrees to the plan.    Electronically signed by Liane Eli MD 02/24/25             [1] No Known Allergies

## 2025-02-24 NOTE — ASSESSMENT & PLAN NOTE
Continue losartan-hydrochlorothiazide 100-25 milligrams daily.  Patient will be having labs done next month that have been ordered by his endocrinologist. Will follow up on Na, K, and kidney function. Follow up with me in 6 months.

## 2025-02-27 ENCOUNTER — TELEPHONE (OUTPATIENT)
Dept: INTERNAL MEDICINE CLINIC | Facility: CLINIC | Age: 70
End: 2025-02-27

## 2025-02-27 NOTE — TELEPHONE ENCOUNTER
Dexcom G7 Sensor    [email or web address]  KEY:  [id number]      Current Outpatient Medications:       Continuous Glucose Sensor (DEXCOM G7 SENSOR) Does not apply Misc, 1 each As Directed. INSERT 1 SENSOR AT THE BACK OF THE ARM AND CHANGE EVERY 10 DAYS., Disp: 9 each, Rfl: 3

## 2025-02-28 NOTE — TELEPHONE ENCOUNTER
Dr. Zuñiga should be able to prescribe the CGM, he does not meet all criteria needed for it to come from me

## 2025-03-04 NOTE — TELEPHONE ENCOUNTER
PA completed via [employer] for G7:  Case Id  [id number]  Reference Id  [id number]    Will most likely be denied d/t not being on insulin - can provide patient with cash paying options

## 2025-04-04 NOTE — TELEPHONE ENCOUNTER
Please review.  Protocol failed / Has no protocol.     Patient has labs outstanding from 1/6/2025 Dr. Zuñiga.    Requested Prescriptions   Pending Prescriptions Disp Refills    METFORMIN HCL 1000 MG Oral Tab [Pharmacy Med Name: METFORMIN HCL 1,000 MG TABLET] 180 tablet 0     Sig: TAKE 1 TABLET BY MOUTH TWICE A DAY WITH MEALS       Diabetes Medication Protocol Failed - 4/4/2025  6:40 PM        Failed - Microalbumin procedure in past 12 months or taking ACE/ARB        Failed - EGFRCR or GFRAA > 50        Failed - GFR in the past 12 months        Passed - Last A1C < 7.5 and within past 6 months        Passed - In person appointment or virtual visit in the past 6 mos or appointment in next 3 mos        Passed - Medication is active on med list

## 2025-04-07 NOTE — TELEPHONE ENCOUNTER
Spoke with patient, Date of Birth verified  She was informed of MD message, pt stated understanding.

## 2025-05-20 ENCOUNTER — LAB ENCOUNTER (OUTPATIENT)
Dept: LAB | Facility: HOSPITAL | Age: 70
End: 2025-05-20
Attending: INTERNAL MEDICINE
Payer: COMMERCIAL

## 2025-05-20 DIAGNOSIS — E11.65 TYPE 2 DIABETES MELLITUS WITH HYPERGLYCEMIA, WITHOUT LONG-TERM CURRENT USE OF INSULIN (HCC): ICD-10-CM

## 2025-05-20 DIAGNOSIS — E78.5 DYSLIPIDEMIA: ICD-10-CM

## 2025-05-20 LAB
ALBUMIN SERPL-MCNC: 4.8 G/DL (ref 3.2–4.8)
ALBUMIN/GLOB SERPL: 1.6 {RATIO} (ref 1–2)
ALP LIVER SERPL-CCNC: 108 U/L (ref 45–117)
ALT SERPL-CCNC: 10 U/L (ref 10–49)
ANION GAP SERPL CALC-SCNC: 10 MMOL/L (ref 0–18)
AST SERPL-CCNC: 14 U/L (ref ?–34)
BILIRUB SERPL-MCNC: 0.6 MG/DL (ref 0.2–1.1)
BUN BLD-MCNC: 22 MG/DL (ref 9–23)
BUN/CREAT SERPL: 21.6 (ref 10–20)
CALCIUM BLD-MCNC: 9.8 MG/DL (ref 8.7–10.4)
CHLORIDE SERPL-SCNC: 103 MMOL/L (ref 98–112)
CHOLEST SERPL-MCNC: 122 MG/DL (ref ?–200)
CO2 SERPL-SCNC: 26 MMOL/L (ref 21–32)
CREAT BLD-MCNC: 1.02 MG/DL (ref 0.7–1.3)
CREAT UR-SCNC: 159 MG/DL
EGFRCR SERPLBLD CKD-EPI 2021: 80 ML/MIN/1.73M2 (ref 60–?)
FASTING PATIENT LIPID ANSWER: YES
FASTING STATUS PATIENT QL REPORTED: YES
GLOBULIN PLAS-MCNC: 3 G/DL (ref 2–3.5)
GLUCOSE BLD-MCNC: 169 MG/DL (ref 70–99)
HDLC SERPL-MCNC: 36 MG/DL (ref 40–59)
LDLC SERPL CALC-MCNC: 66 MG/DL (ref ?–100)
MICROALBUMIN UR-MCNC: <0.3 MG/DL
NONHDLC SERPL-MCNC: 86 MG/DL (ref ?–130)
OSMOLALITY SERPL CALC.SUM OF ELEC: 295 MOSM/KG (ref 275–295)
POTASSIUM SERPL-SCNC: 3.7 MMOL/L (ref 3.5–5.1)
PROT SERPL-MCNC: 7.8 G/DL (ref 5.7–8.2)
SODIUM SERPL-SCNC: 139 MMOL/L (ref 136–145)
TRIGL SERPL-MCNC: 110 MG/DL (ref 30–149)
VLDLC SERPL CALC-MCNC: 17 MG/DL (ref 0–30)

## 2025-05-20 PROCEDURE — 80053 COMPREHEN METABOLIC PANEL: CPT

## 2025-05-20 PROCEDURE — 82570 ASSAY OF URINE CREATININE: CPT

## 2025-05-20 PROCEDURE — 36415 COLL VENOUS BLD VENIPUNCTURE: CPT

## 2025-05-20 PROCEDURE — 80061 LIPID PANEL: CPT

## 2025-05-20 PROCEDURE — 82043 UR ALBUMIN QUANTITATIVE: CPT

## 2025-06-09 ENCOUNTER — OFFICE VISIT (OUTPATIENT)
Dept: ENDOCRINOLOGY CLINIC | Facility: CLINIC | Age: 70
End: 2025-06-09

## 2025-06-09 VITALS
SYSTOLIC BLOOD PRESSURE: 124 MMHG | WEIGHT: 248 LBS | DIASTOLIC BLOOD PRESSURE: 76 MMHG | HEART RATE: 64 BPM | BODY MASS INDEX: 32 KG/M2

## 2025-06-09 DIAGNOSIS — E11.69 TYPE 2 DIABETES MELLITUS WITH OTHER SPECIFIED COMPLICATION, WITHOUT LONG-TERM CURRENT USE OF INSULIN (HCC): Primary | ICD-10-CM

## 2025-06-09 DIAGNOSIS — E78.5 DYSLIPIDEMIA: ICD-10-CM

## 2025-06-09 LAB
GLUCOSE BLOOD: 242
HEMOGLOBIN A1C: 6.1 % (ref 4.3–5.6)
TEST STRIP LOT #: NORMAL NUMERIC

## 2025-06-09 RX ORDER — SEMAGLUTIDE 1.34 MG/ML
1 INJECTION, SOLUTION SUBCUTANEOUS WEEKLY
Qty: 9 ML | Refills: 1 | Status: SHIPPED | OUTPATIENT
Start: 2025-06-09

## 2025-06-09 NOTE — PROGRESS NOTES
Name: Jan Casas  YOB: 1955  Report Period: 05/13/2025 - 06/09/2025 (28 days)  Generated: 06/09/2025  Time CGM Active: 78%      Glucose Statistics and Targets  Average Glucose: 153 mg/dL  Glucose Management Indicator (GMI): 7.0%  Glucose Variability (%CV): 21.5%  Target Range: 70 - 180 mg/dL      Time in Ranges  Very High: >250 mg/dL --- 1%  High: 181 - 250 mg/dL --- 18%  Target Range: 70 - 180 mg/dL --- 81%  Low: 54 - 69 mg/dL --- 0%  Very Low: <54 mg/dL --- 0%

## 2025-06-09 NOTE — PROGRESS NOTES
FU VISIT     CHIEF COMPLAINT:    DM     HISTORY OF PRESENT ILLNESS:   Jan Casas is a 70 year old male who is here to FU  for DM.     DM HISTORY  Diagnosed:       HISTORY OF DIABETES COMPLICATIONS: :  History of Retinopathy: Jan 2025 - last eye exam: 2024 [facility]  History of Neuropathy: yes --> stable  History of Nephropathy: no     ASSOCIATED COMPLICATIONS:   HTN: yes  Hyperlipidemia: yes  Coronary Artery Disease:  denies  Cerebrovascular Disease: denies      HOME GLUCOSE READINGS:   CGM download as below      CURRENT DIABETIC MEDICATIONS INCLUDE:  MTF 1000 mg BF and dinner  Ozempic 0.5 mg weekly     MEALS:  Moderate compliance  Two main meals a day   He works from 10 pm to 6 am and on some days 6 pm to 6 am     EXERCISE:  Very active at work      CURRENT MEDICATIONS:    Current Outpatient Medications   Medication Sig Dispense Refill    semaglutide (OZEMPIC, 1 MG/DOSE,) 4 MG/3ML Subcutaneous Solution Pen-injector Inject 1 mg into the skin once a week. 9 mL 1    losartan-hydroCHLOROthiazide 100-25 MG Oral Tab Take 1 tablet by mouth daily. 90 tablet 3    atorvastatin 20 MG Oral Tab Take 1 tablet (20 mg total) by mouth nightly. 90 tablet 3    Continuous Glucose  (DEXCOM G7 ) Does not apply Device 1 each As Directed. 1 each 0    Continuous Glucose Sensor (DEXCOM G7 SENSOR) Does not apply Misc 1 each As Directed. INSERT 1 SENSOR AT THE BACK OF THE ARM AND CHANGE EVERY 10 DAYS. 9 each 3    Continuous Glucose Sensor (FREESTYLE SINGH 2 SENSOR) Does not apply Misc 1 each every 14 (fourteen) days. 6 each 3    Continuous Blood Gluc  (FREESTYLE SINGH 2 READER) Does not apply Device 1 each every 14 (fourteen) days. Use every 14 days 6 each 3    cholecalciferol 1000 UNITS Oral Cap Take 1 capsule (1,000 Units total) by mouth every other day.      aspirin 325 MG Oral Tab Take 1 tablet (325 mg total) by mouth daily.      Multiple Vitamins-Minerals (PRESERVISION AREDS) Oral Tab Take 2  tablets by mouth daily.         PAST MEDICAL HISTORY:   Past Medical History:    Diabetes (HCC)    Dyslipidemia associated with type 2 diabetes mellitus (HCC)    Essential hypertension    Hx of adenomatous colonic polyps    None in . Repeat CLN in .    Peripheral sensory neuropathy due to type 2 diabetes mellitus (HCC)    Primary osteoarthritis of knees, bilateral    Type 2 diabetes mellitus (HCC)       PAST SURGICAL HISTORY:   Past Surgical History:   Procedure Laterality Date    Appendectomy         ALLERGIES:  No Known Allergies    SOCIAL HISTORY:    Social History     Socioeconomic History    Marital status:    Tobacco Use    Smoking status: Never    Smokeless tobacco: Never   Vaping Use    Vaping status: Never Used   Substance and Sexual Activity    Alcohol use: No    Drug use: No       FAMILY HISTORY:   Family History   Problem Relation Age of Onset    Hypertension Mother     Diabetes Mother     Other (CVA) Mother          age 83    Diabetes Sister     Hypertension Sister        ASSESSMENTS:        REVIEW OF SYSTEMS:  Constitutional: Negative for: weight change, fever, fatigue, cold/heat intolerance  Eyes: Negative for:  Visual changes, proptosis, blurring, floaters, poor night vision, impaired color vision  ENT: Negative for:  dysphagia, neck swelling, dysphonia  Respiratory: Negative for:  dyspnea, cough  Cardiovascular: Negative for:  chest pain, palpitations, orthopnea  GI: Negative for:  abdominal pain, nausea, vomiting, diarrhea, constipation, bleeding  Neurology: Negative for: headache, numbness, weakness,   Genito-Urinary: Negative for: dysuria, frequency  Psychiatric: Negative for:  depression, anxiety  Hematology/Lymphatics: Negative for: bruising, lower extremity edema  Endocrine: Negative for: polydypsia,  polyuria  Skin: Negative for: rash, blister,      PHYSICAL EXAM:   Vitals:    25 1323   BP: 140/79   Pulse: 94   Weight: 248 lb (112.5 kg)     BMI: Body mass index is  31.84 kg/m².         General Appearance:  alert, well developed, in no acute distress  Head: Atraumatic  Eyes:  normal conjunctivae, sclera., normal sclera and normal pupils  Throat/Neck: normal sound to voice. Normal hearing, normal speech  Respiratory:  Speaking in full sentences, non-labored. no increased work of breathing, no audible wheezing    Neuro: motor grossly intact, moving all extremities without difficulty  Psychiatric:  oriented to time, self, and place  Extremities: July 2024  Bilateral barefoot skin diabetic exam is normal, visualized feet and the appearance is normal.  Bilateral monofilament/sensation of both feet is decrease  Pulsation pedal pulse exam of both lower legs/feet is normal as well.            DATA:     Pertinent data reviewed    A1c is 6.1 %     ASSESSMENT AND PLAN:    1.  Type 2 DM:      Plan:  Discussed the pathogenesis, natural course of diabetes. Patient understands the importance of glycemic control and the implications of uncontrolled diabetes including Diabetic ketoacidosis and various micro vascular and macrovascular complications.        a). Medications:    Continuous Glucose Monitoring Interpretation    Jan Casas has undergone continuous glucose monitoring with the personal alphonso  He was evaluated with the alphonso from  05/13/2025 - 06/09/2025 (28 days)  Generated: 06/09/2025  Time CGM Active: 78%        Glucose Statistics and Targets  Average Glucose: 153 mg/dL  Glucose Management Indicator (GMI): 7.0%  Glucose Variability (%CV): 21.5%  Target Range: 70 - 180 mg/dL        Time in Ranges  Very High: >250 mg/dL --- 1%  High: 181 - 250 mg/dL --- 18%  Target Range: 70 - 180 mg/dL --- 81%  Low: 54 - 69 mg/dL --- 0%  Very Low: <54 mg/dL --- 0%               As a result of his testing the following plan was made:     STOP MTF      Ozempic  0.5--> 1  mg weekly   No personal or family history of MEN syndrome  Patient counselled regarding side effects including injection site  reactions, nausea, vomiting, diarrhea, pancreatitis, gastroparesis and rare side effect helen Jacob syndrome.    Check and call with sugars as discussed   Will eventually stop the jardiance if he responds well to the ozempic    b). No Nephropathy  c). Discussed importance of annual eye exams  d). Foot exam: Daily feet exam explained  e). BG log maintainence explained in great detail, to get log and glucometer on next visit.  f). Life style changes reviewed  g). Hypoglycemia recognition and management discussed    2. Patient’s BP is  normal today  3. Dyslipidemia  A) Discussed lifestyle modifications including reductions in dietary total and saturated fat, weight loss, aerobic exercise, and eating a diet rich in fruits and vegetables.  B) Statin therapy  Discussed the potential side effects of statins including muscle and liver injury.    Check and call with sugars as discussed  Fasting labs as below      Orders Placed This Encounter   Procedures    POC HemoCue Glucose 201 (Finger stick glucose)    POC Glycohemoglobin [25199]         Mireille Zuñiga MD

## 2025-07-15 ENCOUNTER — TELEPHONE (OUTPATIENT)
Dept: INTERNAL MEDICINE CLINIC | Facility: CLINIC | Age: 70
End: 2025-07-15

## 2025-08-25 ENCOUNTER — OFFICE VISIT (OUTPATIENT)
Dept: INTERNAL MEDICINE CLINIC | Facility: CLINIC | Age: 70
End: 2025-08-25

## 2025-08-25 VITALS
TEMPERATURE: 98 F | HEART RATE: 86 BPM | HEIGHT: 74 IN | WEIGHT: 250 LBS | DIASTOLIC BLOOD PRESSURE: 82 MMHG | BODY MASS INDEX: 32.08 KG/M2 | SYSTOLIC BLOOD PRESSURE: 127 MMHG

## 2025-08-25 DIAGNOSIS — Z12.5 ENCOUNTER FOR PROSTATE CANCER SCREENING: ICD-10-CM

## 2025-08-25 DIAGNOSIS — E11.69 DYSLIPIDEMIA ASSOCIATED WITH TYPE 2 DIABETES MELLITUS (HCC): ICD-10-CM

## 2025-08-25 DIAGNOSIS — E11.40 TYPE 2 DIABETES MELLITUS WITH DIABETIC NEUROPATHY, WITHOUT LONG-TERM CURRENT USE OF INSULIN (HCC): ICD-10-CM

## 2025-08-25 DIAGNOSIS — I10 ESSENTIAL HYPERTENSION: ICD-10-CM

## 2025-08-25 DIAGNOSIS — Z00.01 ENCOUNTER FOR GENERAL ADULT MEDICAL EXAMINATION WITH ABNORMAL FINDINGS: Primary | ICD-10-CM

## 2025-08-25 DIAGNOSIS — Z12.11 ENCOUNTER FOR SCREENING COLONOSCOPY: ICD-10-CM

## 2025-08-25 DIAGNOSIS — E78.5 DYSLIPIDEMIA ASSOCIATED WITH TYPE 2 DIABETES MELLITUS (HCC): ICD-10-CM

## 2025-08-25 DIAGNOSIS — M54.12 CERVICAL RADICULOPATHY: ICD-10-CM

## 2025-08-25 PROCEDURE — 99214 OFFICE O/P EST MOD 30 MIN: CPT | Performed by: INTERNAL MEDICINE

## 2025-08-25 PROCEDURE — 3008F BODY MASS INDEX DOCD: CPT | Performed by: INTERNAL MEDICINE

## 2025-08-25 PROCEDURE — 3044F HG A1C LEVEL LT 7.0%: CPT | Performed by: INTERNAL MEDICINE

## 2025-08-25 PROCEDURE — G0439 PPPS, SUBSEQ VISIT: HCPCS | Performed by: INTERNAL MEDICINE

## 2025-08-25 PROCEDURE — 3079F DIAST BP 80-89 MM HG: CPT | Performed by: INTERNAL MEDICINE

## 2025-08-25 PROCEDURE — 3074F SYST BP LT 130 MM HG: CPT | Performed by: INTERNAL MEDICINE

## 2025-08-26 ENCOUNTER — LAB ENCOUNTER (OUTPATIENT)
Dept: LAB | Age: 70
End: 2025-08-26
Attending: INTERNAL MEDICINE

## 2025-08-26 DIAGNOSIS — E78.5 DYSLIPIDEMIA ASSOCIATED WITH TYPE 2 DIABETES MELLITUS (HCC): ICD-10-CM

## 2025-08-26 DIAGNOSIS — E11.69 DYSLIPIDEMIA ASSOCIATED WITH TYPE 2 DIABETES MELLITUS (HCC): ICD-10-CM

## 2025-08-26 DIAGNOSIS — Z00.01 ENCOUNTER FOR GENERAL ADULT MEDICAL EXAMINATION WITH ABNORMAL FINDINGS: ICD-10-CM

## 2025-08-26 DIAGNOSIS — E11.40 TYPE 2 DIABETES MELLITUS WITH DIABETIC NEUROPATHY, WITHOUT LONG-TERM CURRENT USE OF INSULIN (HCC): ICD-10-CM

## 2025-08-26 DIAGNOSIS — I10 ESSENTIAL HYPERTENSION: ICD-10-CM

## 2025-08-26 DIAGNOSIS — Z12.5 ENCOUNTER FOR PROSTATE CANCER SCREENING: ICD-10-CM

## 2025-08-26 LAB
ALBUMIN SERPL-MCNC: 4.4 G/DL (ref 3.2–4.8)
ALBUMIN/GLOB SERPL: 1.3 (ref 1–2)
ALP LIVER SERPL-CCNC: 108 U/L (ref 45–117)
ALT SERPL-CCNC: 14 U/L (ref 10–49)
ANION GAP SERPL CALC-SCNC: 9 MMOL/L (ref 0–18)
AST SERPL-CCNC: 17 U/L (ref ?–34)
BASOPHILS # BLD AUTO: 0.05 X10(3) UL (ref 0–0.2)
BASOPHILS NFR BLD AUTO: 0.5 %
BILIRUB SERPL-MCNC: 0.9 MG/DL (ref 0.2–1.1)
BUN BLD-MCNC: 13 MG/DL (ref 9–23)
BUN/CREAT SERPL: 12.1 (ref 10–20)
CALCIUM BLD-MCNC: 9.3 MG/DL (ref 8.7–10.4)
CHLORIDE SERPL-SCNC: 102 MMOL/L (ref 98–112)
CHOLEST SERPL-MCNC: 118 MG/DL (ref ?–200)
CO2 SERPL-SCNC: 26 MMOL/L (ref 21–32)
COMPLEXED PSA SERPL-MCNC: 1.47 NG/ML (ref ?–4)
CREAT BLD-MCNC: 1.07 MG/DL (ref 0.7–1.3)
DEPRECATED RDW RBC AUTO: 36.5 FL (ref 35.1–46.3)
EGFRCR SERPLBLD CKD-EPI 2021: 76 ML/MIN/1.73M2 (ref 60–?)
EOSINOPHIL # BLD AUTO: 0.24 X10(3) UL (ref 0–0.7)
EOSINOPHIL NFR BLD AUTO: 2.6 %
ERYTHROCYTE [DISTWIDTH] IN BLOOD BY AUTOMATED COUNT: 12 % (ref 11–15)
FASTING PATIENT LIPID ANSWER: YES
FASTING STATUS PATIENT QL REPORTED: YES
GLOBULIN PLAS-MCNC: 3.4 G/DL (ref 2–3.5)
GLUCOSE BLD-MCNC: 184 MG/DL (ref 70–99)
HCT VFR BLD AUTO: 38.8 % (ref 39–53)
HDLC SERPL-MCNC: 39 MG/DL (ref 40–59)
HGB BLD-MCNC: 13.1 G/DL (ref 13–17.5)
IMM GRANULOCYTES # BLD AUTO: 0.04 X10(3) UL (ref 0–1)
IMM GRANULOCYTES NFR BLD: 0.4 %
LDLC SERPL CALC-MCNC: 54 MG/DL (ref ?–100)
LYMPHOCYTES # BLD AUTO: 2.4 X10(3) UL (ref 1–4)
LYMPHOCYTES NFR BLD AUTO: 25.6 %
MCH RBC QN AUTO: 28.6 PG (ref 26–34)
MCHC RBC AUTO-ENTMCNC: 33.8 G/DL (ref 31–37)
MCV RBC AUTO: 84.7 FL (ref 80–100)
MONOCYTES # BLD AUTO: 0.66 X10(3) UL (ref 0.1–1)
MONOCYTES NFR BLD AUTO: 7 %
NEUTROPHILS # BLD AUTO: 6 X10 (3) UL (ref 1.5–7.7)
NEUTROPHILS # BLD AUTO: 6 X10(3) UL (ref 1.5–7.7)
NEUTROPHILS NFR BLD AUTO: 63.9 %
NONHDLC SERPL-MCNC: 79 MG/DL (ref ?–130)
OSMOLALITY SERPL CALC.SUM OF ELEC: 289 MOSM/KG (ref 275–295)
PLATELET # BLD AUTO: 264 10(3)UL (ref 150–450)
POTASSIUM SERPL-SCNC: 3.6 MMOL/L (ref 3.5–5.1)
PROT SERPL-MCNC: 7.8 G/DL (ref 5.7–8.2)
RBC # BLD AUTO: 4.58 X10(6)UL (ref 3.8–5.8)
SODIUM SERPL-SCNC: 137 MMOL/L (ref 136–145)
T3FREE SERPL-MCNC: 3.06 PG/ML (ref 2.4–4.2)
T4 FREE SERPL-MCNC: 1.1 NG/DL (ref 0.8–1.7)
TRIGL SERPL-MCNC: 142 MG/DL (ref 30–149)
TSI SER-ACNC: 0.55 UIU/ML (ref 0.55–4.78)
VIT D+METAB SERPL-MCNC: 13.9 NG/ML (ref 30–100)
VLDLC SERPL CALC-MCNC: 21 MG/DL (ref 0–30)
WBC # BLD AUTO: 9.4 X10(3) UL (ref 4–11)

## 2025-08-26 PROCEDURE — 84481 FREE ASSAY (FT-3): CPT

## 2025-08-26 PROCEDURE — 85025 COMPLETE CBC W/AUTO DIFF WBC: CPT

## 2025-08-26 PROCEDURE — 80061 LIPID PANEL: CPT

## 2025-08-26 PROCEDURE — 82306 VITAMIN D 25 HYDROXY: CPT

## 2025-08-26 PROCEDURE — 84443 ASSAY THYROID STIM HORMONE: CPT

## 2025-08-26 PROCEDURE — 80053 COMPREHEN METABOLIC PANEL: CPT

## 2025-08-26 PROCEDURE — 36415 COLL VENOUS BLD VENIPUNCTURE: CPT

## 2025-08-26 PROCEDURE — 84439 ASSAY OF FREE THYROXINE: CPT

## (undated) DIAGNOSIS — K64.8 INTERNAL HEMORRHOIDS: ICD-10-CM

## (undated) NOTE — Clinical Note
Thank you for the consult. I saw  Mr. Casas in the endocrine/diabetes clinic today. Please see attached my note. Please feel free to contact me with any questions. Thanks!

## (undated) NOTE — LETTER
2/11/2022    Jodie Lucero.        [address] [facility] [address]            Dear Jodie Lucero.,      Our records indicate that you are due for an appointment for a Colonoscopy in March 2022, or sometime there after, with Jaswinder Mccann MD.    Please call our office to schedule this appointment. Your medical well-being is important to us. If your insurance requires a referral, please call your primary care office to request one.       Thank you,      The Physicians and Staff at [facility]

## (undated) NOTE — LETTER
AUTHORIZATION FOR SURGICAL OPERATION OR OTHER PROCEDURE    1. I hereby authorize Dr. Melissa Ball PA-C, and [place] [facility], [facility] staff assigned to my case to perform the following operation and/or procedure at the [facility], [facility]:    Left knee cortisone   _______________________________________________________________________________________________      _______________________________________________________________________________________________    2. My physician has explained the nature and purpose of the operation or other procedure, possible alternative methods of treatment, the risks involved, and the possibility of complication to me. I acknowledge that no guarantee has been made as to the result that may be obtained. 3.  I recognize that, during the course of this operation, or other procedure, unforseen conditions may necessitate additional or different procedure than those listed above. I, therefore, further authorize and request that the above named physician, his/her physician assistants or designees perform such procedures as are, in his/her professional opinion, necessary and desirable. 4.  Any tissue or organs removed in the operation or other procedure may be disposed of by and at the discretion of the [facility], [facility] and [facility]. 5.  I understand that in the event of a medical emergency, I will be transported by local paramedics to [facility] or other hospital emergency department. 6.  I certify that I have read and fully understand the above consent to operation and/or other procedure. 7.  I acknowledge that my physician has explained sedation/analgesia administration to me including the risks and benefits. I consent to the administration of sedation/analgesia as may be necessary or desirable in the judgement of my physician.     Witness signature: ___________________________________________________ Date:  ______/______/_____ Time:  ________ A. M.  P.M. Patient Name:  ______________________________________________________  (please print)      Patient signature:  ___________________________________________________             Relationship to Patient:           []  Parent    Responsible person                          []  Spouse  In case of minor or                    [] Other  _____________   Incompetent name:  __________________________________________________                               (please print)      _____________      Responsible person  In case of minor or  Incompetent signature:  _______________________________________________    Statement of Physician  My signature below affirms that prior to the time of the procedure, I have explained to the patient and/or his/her guardian, the risks and benefits involved in the proposed treatment and any reasonable alternative to the proposed treatment. I have also explained the risks and benefits involved in the refusal of the proposed treatment and have answered the patient's questions.                         Date:  ______/______/_______  Provider                      Signature:  __________________________________________________________       Time:  ___________ A.M    P.M.

## (undated) NOTE — LETTER
12/17/19        Sherwin July  [address]  [address]      Dear Topher Bullard records indicate that you have outstanding lab work and or testing that was ordered for you and has not yet been completed:  Orders Placed This Encounter      H

## (undated) NOTE — LETTER
AUTHORIZATION FOR SURGICAL OPERATION OR OTHER PROCEDURE    1. I hereby authorize Dr. Velia Palomino PA-C, and [place] [facility], [facility] staff assigned to my case to perform the following operation and/or procedure at the [facility], [facility]:    _______________________________________________________________________________________________    Durolane injection of the left knee  _______________________________________________________________________________________________    2. My physician has explained the nature and purpose of the operation or other procedure, possible alternative methods of treatment, the risks involved, and the possibility of complication to me. I acknowledge that no guarantee has been made as to the result that may be obtained. 3.  I recognize that, during the course of this operation, or other procedure, unforseen conditions may necessitate additional or different procedure than those listed above. I, therefore, further authorize and request that the above named physician, his/her physician assistants or designees perform such procedures as are, in his/her professional opinion, necessary and desirable. 4.  Any tissue or organs removed in the operation or other procedure may be disposed of by and at the discretion of the [facility], [facility] and [facility]. 5.  I understand that in the event of a medical emergency, I will be transported by local paramedics to [facility] or other hospital emergency department. 6.  I certify that I have read and fully understand the above consent to operation and/or other procedure. 7.  I acknowledge that my physician has explained sedation/analgesia administration to me including the risks and benefits. I consent to the administration of sedation/analgesia as may be necessary or desirable in the judgement of my physician.     Witness signature: ___________________________________________________ Date: ______/______/_____                    Time:  ________ A. M.  P.M. Patient Name:  ______________________________________________________  (please print)      Patient signature:  ___________________________________________________             Relationship to Patient:           []  Parent    Responsible person                          []  Spouse  In case of minor or                    [] Other  _____________   Incompetent name:  __________________________________________________                               (please print)      _____________      Responsible person  In case of minor or  Incompetent signature:  _______________________________________________    Statement of Physician  My signature below affirms that prior to the time of the procedure, I have explained to the patient and/or his/her guardian, the risks and benefits involved in the proposed treatment and any reasonable alternative to the proposed treatment. I have also explained the risks and benefits involved in the refusal of the proposed treatment and have answered the patient's questions.                         Date:  ______/______/_______  Provider                      Signature:  __________________________________________________________       Time:  ___________ A.M    P.M.